# Patient Record
Sex: MALE | Race: BLACK OR AFRICAN AMERICAN
[De-identification: names, ages, dates, MRNs, and addresses within clinical notes are randomized per-mention and may not be internally consistent; named-entity substitution may affect disease eponyms.]

---

## 2022-07-05 ENCOUNTER — APPOINTMENT (OUTPATIENT)
Dept: ORTHOPEDIC SURGERY | Facility: CLINIC | Age: 47
End: 2022-07-05

## 2022-07-05 VITALS — BODY MASS INDEX: 36.45 KG/M2 | HEIGHT: 78 IN | WEIGHT: 315 LBS

## 2022-07-05 PROBLEM — Z00.00 ENCOUNTER FOR PREVENTIVE HEALTH EXAMINATION: Status: ACTIVE | Noted: 2022-07-05

## 2022-07-05 PROCEDURE — 72100 X-RAY EXAM L-S SPINE 2/3 VWS: CPT

## 2022-07-05 PROCEDURE — 78306 BONE IMAGING WHOLE BODY: CPT

## 2022-07-05 RX ORDER — METHOCARBAMOL 750 MG/1
750 TABLET, FILM COATED ORAL TWICE DAILY
Qty: 60 | Refills: 0 | Status: ACTIVE | COMMUNITY
Start: 2022-07-05 | End: 1900-01-01

## 2022-07-05 NOTE — DATA REVIEWED
[Outside X-rays] : outside x-rays [Bilateral] : of the bilateral [Hip] : hip [I reviewed the films/CD and agree] : I reviewed the films/CD and agree [FreeTextEntry1] : left shows osteolysis and loosening around the proximal left prosthesis, right is ok

## 2022-07-05 NOTE — PHYSICAL EXAM
[Flexion] : flexion [Extension] : extension [Bending to left] : bending to left [Bending to right] : bending to right [Straightening consistent with spasm] : Straightening consistent with spasm [Facet arthropathy] : Facet arthropathy [Disc space narrowing] : Disc space narrowing [de-identified] : he notes radicular complaints throughout b/l lower extremities ant to the shins [Bilateral] : hip bilaterally [] : no groin pain with resisted straight leg raise [Right] : right hip [AP] : anteroposterior [Lateral] : lateral [Components well fixed, in good position] : Components well fixed, in good position [FreeTextEntry9] : left hip with some lysis around metaphyseal aspect femoral component

## 2022-07-05 NOTE — HISTORY OF PRESENT ILLNESS
[Sudden] : sudden [8] : 8 [5] : 5 [Dull/Aching] : dull/aching [Localized] : localized [Radiating] : radiating [Tingling] : tingling [Constant] : constant [Nothing helps with pain getting better] : Nothing helps with pain getting better [Walking] : walking [de-identified] : 7-5-22- He is known to have had b/l total hip replacement left in 2010 and right in 2012.\par He notes over the last month  he has had pain lower back with radicular complaints throughout the lower legs. [facility] the hips which he presents with today. he was given naprosyn without help. 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 [FreeTextEntry6] : weakness [FreeTextEntry7] : Legs  [de-identified] : 6/26/22 [de-identified] : [facility]  [de-identified] : X-ray

## 2022-07-15 ENCOUNTER — RESULT REVIEW (OUTPATIENT)
Age: 47
End: 2022-07-15

## 2022-07-21 ENCOUNTER — APPOINTMENT (OUTPATIENT)
Dept: ORTHOPEDIC SURGERY | Facility: CLINIC | Age: 47
End: 2022-07-21

## 2022-07-21 ENCOUNTER — NON-APPOINTMENT (OUTPATIENT)
Age: 47
End: 2022-07-21

## 2022-07-21 ENCOUNTER — RESULT REVIEW (OUTPATIENT)
Age: 47
End: 2022-07-21

## 2022-07-21 ENCOUNTER — INPATIENT (INPATIENT)
Facility: HOSPITAL | Age: 47
LOS: 1 days | Discharge: HOME | DRG: 299 | End: 2022-07-23
Attending: FAMILY MEDICINE | Admitting: INTERNAL MEDICINE
Payer: COMMERCIAL

## 2022-07-21 DIAGNOSIS — I26.99 OTHER PULMONARY EMBOLISM WITHOUT ACUTE COR PULMONALE: ICD-10-CM

## 2022-07-21 LAB
ADD ON TEST-SPECIMEN IN LAB: SIGNIFICANT CHANGE UP
ALBUMIN SERPL ELPH-MCNC: 3.9 G/DL — SIGNIFICANT CHANGE UP (ref 3.3–5)
ALP SERPL-CCNC: 92 U/L — SIGNIFICANT CHANGE UP (ref 40–120)
ALT FLD-CCNC: 34 U/L — SIGNIFICANT CHANGE UP (ref 12–78)
ANION GAP SERPL CALC-SCNC: 7 MMOL/L — SIGNIFICANT CHANGE UP (ref 5–17)
APTT BLD: 42.4 SEC — HIGH (ref 27.5–35.5)
AST SERPL-CCNC: 19 U/L — SIGNIFICANT CHANGE UP (ref 15–37)
BASOPHILS # BLD AUTO: 0.03 K/UL — SIGNIFICANT CHANGE UP (ref 0–0.2)
BASOPHILS NFR BLD AUTO: 0.7 % — SIGNIFICANT CHANGE UP (ref 0–2)
BILIRUB SERPL-MCNC: 0.6 MG/DL — SIGNIFICANT CHANGE UP (ref 0.2–1.2)
BUN SERPL-MCNC: 16 MG/DL — SIGNIFICANT CHANGE UP (ref 7–23)
CALCIUM SERPL-MCNC: 9.9 MG/DL — SIGNIFICANT CHANGE UP (ref 8.5–10.1)
CHLORIDE SERPL-SCNC: 104 MMOL/L — SIGNIFICANT CHANGE UP (ref 96–108)
CO2 SERPL-SCNC: 26 MMOL/L — SIGNIFICANT CHANGE UP (ref 22–31)
CREAT SERPL-MCNC: 1.14 MG/DL — SIGNIFICANT CHANGE UP (ref 0.5–1.3)
EGFR: 81 ML/MIN/1.73M2 — SIGNIFICANT CHANGE UP
EOSINOPHIL # BLD AUTO: 0.22 K/UL — SIGNIFICANT CHANGE UP (ref 0–0.5)
EOSINOPHIL NFR BLD AUTO: 4.8 % — SIGNIFICANT CHANGE UP (ref 0–6)
GLUCOSE SERPL-MCNC: 91 MG/DL — SIGNIFICANT CHANGE UP (ref 70–99)
HCT VFR BLD CALC: 40.3 % — SIGNIFICANT CHANGE UP (ref 39–50)
HGB BLD-MCNC: 14.1 G/DL — SIGNIFICANT CHANGE UP (ref 13–17)
IMM GRANULOCYTES NFR BLD AUTO: 0.2 % — SIGNIFICANT CHANGE UP (ref 0–1.5)
INR BLD: 1.15 RATIO — SIGNIFICANT CHANGE UP (ref 0.88–1.16)
LYMPHOCYTES # BLD AUTO: 1.94 K/UL — SIGNIFICANT CHANGE UP (ref 1–3.3)
LYMPHOCYTES # BLD AUTO: 42.6 % — SIGNIFICANT CHANGE UP (ref 13–44)
MCHC RBC-ENTMCNC: 32.3 PG — SIGNIFICANT CHANGE UP (ref 27–34)
MCHC RBC-ENTMCNC: 35 GM/DL — SIGNIFICANT CHANGE UP (ref 32–36)
MCV RBC AUTO: 92.4 FL — SIGNIFICANT CHANGE UP (ref 80–100)
MONOCYTES # BLD AUTO: 0.45 K/UL — SIGNIFICANT CHANGE UP (ref 0–0.9)
MONOCYTES NFR BLD AUTO: 9.9 % — SIGNIFICANT CHANGE UP (ref 2–14)
NEUTROPHILS # BLD AUTO: 1.9 K/UL — SIGNIFICANT CHANGE UP (ref 1.8–7.4)
NEUTROPHILS NFR BLD AUTO: 41.8 % — LOW (ref 43–77)
PLATELET # BLD AUTO: 283 K/UL — SIGNIFICANT CHANGE UP (ref 150–400)
POTASSIUM SERPL-MCNC: 4 MMOL/L — SIGNIFICANT CHANGE UP (ref 3.5–5.3)
POTASSIUM SERPL-SCNC: 4 MMOL/L — SIGNIFICANT CHANGE UP (ref 3.5–5.3)
PROT SERPL-MCNC: 8.8 GM/DL — HIGH (ref 6–8.3)
PROTHROM AB SERPL-ACNC: 13.4 SEC — SIGNIFICANT CHANGE UP (ref 10.5–13.4)
RBC # BLD: 4.36 M/UL — SIGNIFICANT CHANGE UP (ref 4.2–5.8)
RBC # FLD: 12.3 % — SIGNIFICANT CHANGE UP (ref 10.3–14.5)
SODIUM SERPL-SCNC: 137 MMOL/L — SIGNIFICANT CHANGE UP (ref 135–145)
WBC # BLD: 4.55 K/UL — SIGNIFICANT CHANGE UP (ref 3.8–10.5)
WBC # FLD AUTO: 4.55 K/UL — SIGNIFICANT CHANGE UP (ref 3.8–10.5)

## 2022-07-21 PROCEDURE — 0241U: CPT

## 2022-07-21 PROCEDURE — 99214 OFFICE O/P EST MOD 30 MIN: CPT

## 2022-07-21 PROCEDURE — 85610 PROTHROMBIN TIME: CPT

## 2022-07-21 PROCEDURE — 93010 ELECTROCARDIOGRAM REPORT: CPT

## 2022-07-21 PROCEDURE — 85730 THROMBOPLASTIN TIME PARTIAL: CPT

## 2022-07-21 PROCEDURE — 85027 COMPLETE CBC AUTOMATED: CPT

## 2022-07-21 PROCEDURE — 80061 LIPID PANEL: CPT

## 2022-07-21 PROCEDURE — 99285 EMERGENCY DEPT VISIT HI MDM: CPT

## 2022-07-21 PROCEDURE — 84100 ASSAY OF PHOSPHORUS: CPT

## 2022-07-21 PROCEDURE — 80053 COMPREHEN METABOLIC PANEL: CPT

## 2022-07-21 PROCEDURE — 93306 TTE W/DOPPLER COMPLETE: CPT

## 2022-07-21 PROCEDURE — 83735 ASSAY OF MAGNESIUM: CPT

## 2022-07-21 PROCEDURE — 93971 EXTREMITY STUDY: CPT | Mod: LT

## 2022-07-21 PROCEDURE — 71275 CT ANGIOGRAPHY CHEST: CPT | Mod: 26,MA

## 2022-07-21 PROCEDURE — 84484 ASSAY OF TROPONIN QUANT: CPT

## 2022-07-21 PROCEDURE — 93005 ELECTROCARDIOGRAM TRACING: CPT

## 2022-07-21 PROCEDURE — 36415 COLL VENOUS BLD VENIPUNCTURE: CPT

## 2022-07-21 PROCEDURE — 80048 BASIC METABOLIC PNL TOTAL CA: CPT

## 2022-07-21 RX ORDER — HEPARIN SODIUM 5000 [USP'U]/ML
5000 INJECTION INTRAVENOUS; SUBCUTANEOUS EVERY 6 HOURS
Refills: 0 | Status: DISCONTINUED | OUTPATIENT
Start: 2022-07-21 | End: 2022-07-22

## 2022-07-21 RX ORDER — HEPARIN SODIUM 5000 [USP'U]/ML
10000 INJECTION INTRAVENOUS; SUBCUTANEOUS ONCE
Refills: 0 | Status: DISCONTINUED | OUTPATIENT
Start: 2022-07-21 | End: 2022-07-22

## 2022-07-21 RX ORDER — ONDANSETRON 8 MG/1
4 TABLET, FILM COATED ORAL EVERY 6 HOURS
Refills: 0 | Status: DISCONTINUED | OUTPATIENT
Start: 2022-07-21 | End: 2022-07-23

## 2022-07-21 RX ORDER — HEPARIN SODIUM 5000 [USP'U]/ML
INJECTION INTRAVENOUS; SUBCUTANEOUS
Qty: 25000 | Refills: 0 | Status: DISCONTINUED | OUTPATIENT
Start: 2022-07-21 | End: 2022-07-22

## 2022-07-21 RX ORDER — GEMFIBROZIL 600 MG
1 TABLET ORAL
Qty: 0 | Refills: 0 | DISCHARGE

## 2022-07-21 RX ORDER — ALLOPURINOL 300 MG
1 TABLET ORAL
Qty: 0 | Refills: 0 | DISCHARGE

## 2022-07-21 RX ORDER — ACETAMINOPHEN 500 MG
650 TABLET ORAL EVERY 6 HOURS
Refills: 0 | Status: DISCONTINUED | OUTPATIENT
Start: 2022-07-21 | End: 2022-07-23

## 2022-07-21 RX ORDER — HEPARIN SODIUM 5000 [USP'U]/ML
10000 INJECTION INTRAVENOUS; SUBCUTANEOUS EVERY 6 HOURS
Refills: 0 | Status: DISCONTINUED | OUTPATIENT
Start: 2022-07-21 | End: 2022-07-22

## 2022-07-21 RX ORDER — METHOCARBAMOL 500 MG/1
1 TABLET, FILM COATED ORAL
Qty: 0 | Refills: 0 | DISCHARGE

## 2022-07-21 NOTE — ED ADULT NURSE NOTE - NSIMPLEMENTINTERV_GEN_ALL_ED
Implemented All Universal Safety Interventions:  [place] to call system. Call bell, personal items and telephone within reach. Instruct patient to call for assistance. Room bathroom lighting operational. Non-slip footwear when patient is off stretcher. Physically safe environment: no spills, clutter or unnecessary equipment. Stretcher in lowest position, wheels locked, appropriate side rails in place.

## 2022-07-21 NOTE — HISTORY OF PRESENT ILLNESS
[Sudden] : sudden [8] : 8 [5] : 5 [Dull/Aching] : dull/aching [Localized] : localized [Radiating] : radiating [Tingling] : tingling [Constant] : constant [Nothing helps with pain getting better] : Nothing helps with pain getting better [Walking] : walking [de-identified] : 7-5-22- He is known to have had b/l total hip replacement left in 2010 and right in 2012.\par He notes over the last month  he has had pain lower back with radicular complaints throughout the lower legs. [facility] of the hips which he presents with today. he was given naprosyn without help. 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 [FreeTextEntry6] : weakness [FreeTextEntry7] : Legs  [de-identified] : 6/26/22 [de-identified] : [facility]  [de-identified] : X-ray

## 2022-07-21 NOTE — PHARMACOTHERAPY INTERVENTION NOTE - COMMENTS
Medication reconciliation completed.  Reviewed Medication list and confirmed med allergies with patient; confirmed with Dr. First Medron.

## 2022-07-21 NOTE — ED STATDOCS - PROGRESS NOTE DETAILS
JG:  Discussed all results with patient.  Patient unable to follow up with PMD quickly and does not have a vascular or hematology specialist yet.  Denies chest pain and no heart strain on CT.  Discussed case with ICU PA on call for [facility] team.  Will anticoagulate and they will see in AM. In the past patient has been on lovenox and coumadin.  Will get a weight, EKG, add cardiac enzymes and admit for echo, anticoagulation and thrombotic workup.

## 2022-07-21 NOTE — ED ADULT TRIAGE NOTE - CHIEF COMPLAINT QUOTE
pt c/o left sided DVT in lower extremity. pt states he has been experiencing swelling in the left upper leg. pt was seem by PCP and was sent to [facility]. pt had doppler performed which showed  a +DVT. PMH PE. pt does not currently take blood thinners. denies shortness of breath and chest pain. no signs of distress noted. pt ambulatory in triage.

## 2022-07-21 NOTE — DISCUSSION/SUMMARY
[de-identified] : Had blood clots after hip surgery, has family h/o clotting disorder. Needs STAT venous doppler left leg to r/o DVT\par For his spine, PT and Pain Management

## 2022-07-21 NOTE — ED STATDOCS - CLINICAL SUMMARY MEDICAL DECISION MAKING FREE TEXT BOX
46 year old male with h/o of PE s/p hip surgery 10 years ago. now dx with DVT in LLE which was identified due to work up for back pain. labs, CTA chest to rule out pe, pain control, and treatment for DVT.

## 2022-07-21 NOTE — ED ADULT NURSE NOTE - OBJECTIVE STATEMENT
Pt. is A&OX3, presenting to the ER with c/o see md note chief complaint quote. Pt. reports "I went for a nuclear scan and CT-scan on Friday. Today I went to see the Orthopedic doctor for the results, when he told me I needed to go for a doppler on my left leg. I went for the doppler and received the results this evening and was advised to come to the ER because I have a sharath." Denies CP, SOB, fevers, tingling in the extremity, pain, N/V/D, lightheaded or dizzy. No medications taken prior to arrival. Patient has chronic lower back pain and knee pain that he was supposed to start getting Cortisone injections on Thursday.

## 2022-07-21 NOTE — ED STATDOCS - OBJECTIVE STATEMENT
46 year old male with PMHx of PE in 2010, s/p hip replacement, lumbar bulging disc presents to the ED after being dx with DVT in left leg today. Pt states that the swelling began about a month ago. Pt exercises regularly. Pt reports that he has not been able to work because of his back pain. He was ordered to get a battery of imaging. Pt states that recent imaging showed arthritis in his knee, but that could not explain the swelling of left calf. So he was sent for an US of both legs, and a DVT was found in the left leg. Denies chest pain, mid back pain, SOB. Pt notes lower back pain.  No other injuries or complaints.  PMD Dr. Diggs

## 2022-07-21 NOTE — PHYSICAL EXAM
[Flexion] : flexion [Extension] : extension [Bending to left] : bending to left [Bending to right] : bending to right [Straightening consistent with spasm] : Straightening consistent with spasm [Facet arthropathy] : Facet arthropathy [Disc space narrowing] : Disc space narrowing [Bilateral] : hip bilaterally [Right] : right hip [AP] : anteroposterior [Lateral] : lateral [Components well fixed, in good position] : Components well fixed, in good position [Left] : left foot and ankle [Severe] : severe swelling of calf [de-identified] : he notes radicular complaints throughout b/l lower extremities ant to the shins [] : no groin pain with resisted straight leg raise [FreeTextEntry9] : left hip with some lysis around metaphyseal aspect femoral component [FreeTextEntry8] : equivocal sravani

## 2022-07-21 NOTE — REASON FOR VISIT
[FreeTextEntry2] : 7/2122- CT lumbar shows disc bulges, in particular L4/5 bulge/partially calcified with right herniation resulting in moderate central stenosis, mild left and severe right foraminal stenosis. Bone scan did not show definitive loosening of hip replacements

## 2022-07-21 NOTE — DATA REVIEWED
[Outside X-rays] : outside x-rays [CT Scan] : CT scan [Lumbar Spine] : lumbar spine [Triple Phase Bone Scan] : triple phase bone scan [Bilateral] : of the bilateral [Hip] : hip [I reviewed the films/CD and agree] : I reviewed the films/CD and agree

## 2022-07-22 DIAGNOSIS — R09.89 OTHER SPECIFIED SYMPTOMS AND SIGNS INVOLVING THE CIRCULATORY AND RESPIRATORY SYSTEMS: ICD-10-CM

## 2022-07-22 DIAGNOSIS — I26.99 OTHER PULMONARY EMBOLISM WITHOUT ACUTE COR PULMONALE: ICD-10-CM

## 2022-07-22 DIAGNOSIS — Z96.649 PRESENCE OF UNSPECIFIED ARTIFICIAL HIP JOINT: Chronic | ICD-10-CM

## 2022-07-22 LAB
ALBUMIN SERPL ELPH-MCNC: 3.7 G/DL — SIGNIFICANT CHANGE UP (ref 3.3–5)
ALP SERPL-CCNC: 84 U/L — SIGNIFICANT CHANGE UP (ref 40–120)
ALT FLD-CCNC: 35 U/L — SIGNIFICANT CHANGE UP (ref 12–78)
ANION GAP SERPL CALC-SCNC: 4 MMOL/L — LOW (ref 5–17)
APTT BLD: 49.2 SEC — HIGH (ref 27.5–35.5)
AST SERPL-CCNC: 38 U/L — HIGH (ref 15–37)
BILIRUB SERPL-MCNC: 0.5 MG/DL — SIGNIFICANT CHANGE UP (ref 0.2–1.2)
BUN SERPL-MCNC: 17 MG/DL — SIGNIFICANT CHANGE UP (ref 7–23)
CALCIUM SERPL-MCNC: 9.7 MG/DL — SIGNIFICANT CHANGE UP (ref 8.5–10.1)
CHLORIDE SERPL-SCNC: 102 MMOL/L — SIGNIFICANT CHANGE UP (ref 96–108)
CHOLEST SERPL-MCNC: 185 MG/DL — SIGNIFICANT CHANGE UP
CO2 SERPL-SCNC: 28 MMOL/L — SIGNIFICANT CHANGE UP (ref 22–31)
CREAT SERPL-MCNC: 1.04 MG/DL — SIGNIFICANT CHANGE UP (ref 0.5–1.3)
EGFR: 91 ML/MIN/1.73M2 — SIGNIFICANT CHANGE UP
FLUAV AG NPH QL: SIGNIFICANT CHANGE UP
FLUBV AG NPH QL: SIGNIFICANT CHANGE UP
GLUCOSE SERPL-MCNC: 101 MG/DL — HIGH (ref 70–99)
HCT VFR BLD CALC: 39.9 % — SIGNIFICANT CHANGE UP (ref 39–50)
HDLC SERPL-MCNC: 33 MG/DL — LOW
HGB BLD-MCNC: 13.8 G/DL — SIGNIFICANT CHANGE UP (ref 13–17)
INR BLD: 1.24 RATIO — HIGH (ref 0.88–1.16)
LIPID PNL WITH DIRECT LDL SERPL: SIGNIFICANT CHANGE UP MG/DL
MAGNESIUM SERPL-MCNC: 2.5 MG/DL — SIGNIFICANT CHANGE UP (ref 1.6–2.6)
MCHC RBC-ENTMCNC: 32.3 PG — SIGNIFICANT CHANGE UP (ref 27–34)
MCHC RBC-ENTMCNC: 34.6 GM/DL — SIGNIFICANT CHANGE UP (ref 32–36)
MCV RBC AUTO: 93.4 FL — SIGNIFICANT CHANGE UP (ref 80–100)
NON HDL CHOLESTEROL: 152 MG/DL — HIGH
PHOSPHATE SERPL-MCNC: 3.5 MG/DL — SIGNIFICANT CHANGE UP (ref 2.5–4.5)
PLATELET # BLD AUTO: 265 K/UL — SIGNIFICANT CHANGE UP (ref 150–400)
POTASSIUM SERPL-MCNC: 4.6 MMOL/L — SIGNIFICANT CHANGE UP (ref 3.5–5.3)
POTASSIUM SERPL-SCNC: 4.6 MMOL/L — SIGNIFICANT CHANGE UP (ref 3.5–5.3)
PROT SERPL-MCNC: 8.4 GM/DL — HIGH (ref 6–8.3)
PROTHROM AB SERPL-ACNC: 14.4 SEC — HIGH (ref 10.5–13.4)
RBC # BLD: 4.27 M/UL — SIGNIFICANT CHANGE UP (ref 4.2–5.8)
RBC # FLD: 12.5 % — SIGNIFICANT CHANGE UP (ref 10.3–14.5)
RSV RNA NPH QL NAA+NON-PROBE: SIGNIFICANT CHANGE UP
SARS-COV-2 RNA SPEC QL NAA+PROBE: SIGNIFICANT CHANGE UP
SODIUM SERPL-SCNC: 134 MMOL/L — LOW (ref 135–145)
TRIGL SERPL-MCNC: 437 MG/DL — HIGH
TROPONIN I, HIGH SENSITIVITY RESULT: 4.17 NG/L — SIGNIFICANT CHANGE UP
WBC # BLD: 3.58 K/UL — LOW (ref 3.8–10.5)
WBC # FLD AUTO: 3.58 K/UL — LOW (ref 3.8–10.5)

## 2022-07-22 PROCEDURE — 93971 EXTREMITY STUDY: CPT | Mod: 26,LT

## 2022-07-22 PROCEDURE — 93306 TTE W/DOPPLER COMPLETE: CPT | Mod: 26

## 2022-07-22 PROCEDURE — 99255 IP/OBS CONSLTJ NEW/EST HI 80: CPT

## 2022-07-22 PROCEDURE — 99223 1ST HOSP IP/OBS HIGH 75: CPT

## 2022-07-22 PROCEDURE — 99222 1ST HOSP IP/OBS MODERATE 55: CPT | Mod: GC

## 2022-07-22 RX ORDER — RIVAROXABAN 15 MG-20MG
1 KIT ORAL
Qty: 42 | Refills: 0
Start: 2022-07-22 | End: 2022-08-11

## 2022-07-22 RX ORDER — GEMFIBROZIL 600 MG
600 TABLET ORAL
Refills: 0 | Status: DISCONTINUED | OUTPATIENT
Start: 2022-07-22 | End: 2022-07-23

## 2022-07-22 RX ORDER — ENOXAPARIN SODIUM 100 MG/ML
160 INJECTION SUBCUTANEOUS ONCE
Refills: 0 | Status: COMPLETED | OUTPATIENT
Start: 2022-07-22 | End: 2022-07-22

## 2022-07-22 RX ORDER — ENOXAPARIN SODIUM 100 MG/ML
160 INJECTION SUBCUTANEOUS EVERY 12 HOURS
Refills: 0 | Status: DISCONTINUED | OUTPATIENT
Start: 2022-07-22 | End: 2022-07-22

## 2022-07-22 RX ORDER — APIXABAN 2.5 MG/1
10 TABLET, FILM COATED ORAL EVERY 12 HOURS
Refills: 0 | Status: DISCONTINUED | OUTPATIENT
Start: 2022-07-23 | End: 2022-07-23

## 2022-07-22 RX ORDER — APIXABAN 2.5 MG/1
1 TABLET, FILM COATED ORAL
Qty: 14 | Refills: 0
Start: 2022-07-22 | End: 2022-07-28

## 2022-07-22 RX ORDER — ALLOPURINOL 300 MG
300 TABLET ORAL DAILY
Refills: 0 | Status: DISCONTINUED | OUTPATIENT
Start: 2022-07-22 | End: 2022-07-23

## 2022-07-22 RX ADMIN — ENOXAPARIN SODIUM 160 MILLIGRAM(S): 100 INJECTION SUBCUTANEOUS at 12:31

## 2022-07-22 RX ADMIN — Medication 650 MILLIGRAM(S): at 13:40

## 2022-07-22 RX ADMIN — ENOXAPARIN SODIUM 160 MILLIGRAM(S): 100 INJECTION SUBCUTANEOUS at 23:00

## 2022-07-22 RX ADMIN — Medication 300 MILLIGRAM(S): at 10:13

## 2022-07-22 RX ADMIN — Medication 600 MILLIGRAM(S): at 10:13

## 2022-07-22 RX ADMIN — Medication 650 MILLIGRAM(S): at 23:00

## 2022-07-22 RX ADMIN — Medication 650 MILLIGRAM(S): at 12:36

## 2022-07-22 RX ADMIN — Medication 1 TABLET(S): at 10:13

## 2022-07-22 RX ADMIN — ENOXAPARIN SODIUM 160 MILLIGRAM(S): 100 INJECTION SUBCUTANEOUS at 01:15

## 2022-07-22 NOTE — H&P ADULT - ATTENDING COMMENTS
Patient is seen and examined with the resident. Agree with above assessment and plan. Admitted with Pulmonary emboli and DVT. Started on Lovenox. Follow up cardiology consult, echo.

## 2022-07-22 NOTE — PROVIDER CONTACT NOTE (EICU) - ASSESSMENT
Telehealth services precludes physical exam. Pt not examined by me via telehealth services as no access to camera. Exam as per bedside team.

## 2022-07-22 NOTE — CONSULT NOTE ADULT - ASSESSMENT
47 yo male with lower extremity DVT and acute pulmonary embolism - not submassive or massive, PESI 54- low risk, no RV strain, negative biomarkers.      Plan:  -agree with full dose AC and can transition to DOAC upon discharge  -cardiology consulted re: echo  -LE duplex to evaluate complexity of DVT  -FH of protein S deficiency. Prior hx of PE noted on short course of AC following 1st hip replacement.  Prefer to defer thrombophilia work up, as coagulation labs can be inaccurate in the setting of acute PE on AC   -pt will need outpatient hematology follow up for further evaluation of thrombophilia.   47 yo male with lower extremity DVT and acute pulmonary embolism - not submassive or massive, PESI 54- low risk, no RV strain, negative biomarkers.      Plan:  -agree with full dose AC and can transition to DOAC upon discharge  -cardiology consulted re: echo  -LE duplex to evaluate complexity of DVT  -FH of protein S deficiency. Prior hx of PE noted on short course of AC following 1st hip replacement.   Protein S deficiency increases VTE risk. Deep vein thrombosis (DVT) and pulmonary embolism (PE) are most common, but thrombosis also occurs in other sites (cerebral and mesenteric vein).  -Prefer to defer thrombophilia work up, as coagulation labs can be inaccurate in the setting of acute PE on AC.  An erroneous diagnosis can be made with acute VTE or on treatment with certain anticoagulants, especially vitamin K antagonists   -pt will need outpatient hematology follow up for further evaluation of thrombophilia.

## 2022-07-22 NOTE — PROVIDER CONTACT NOTE (EICU) - RECOMMENDATIONS
46M w/ new DVT and PE  - no evidence of submassive PE, pt is HD stable, no lab evidence of R heart strain  - agree w/ heparin gtt  - would get TTE  - heme consult for hypercoagulable workup  - cardiology to see pt in AM  - pt does not require ICU level of care at this time; please call back with any further questions or if clinical status changes  Discussed w/ ICU PA
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
## 2022-07-22 NOTE — CONSULT NOTE ADULT - NS ATTEND AMEND GEN_ALL_CORE FT
Agree with above. 45 yo M w PMH of PE in 2012 after hip replacement surgery, gout & hypertriglyceridemia presents to  ED after LE US found a DVT in the outpt setting. New DVT imaging reviewed and it is nonconclusive with above and below knee. No high risk findings on exam. Does have enlarged left leg which could be baseline from prior surgery, however no pain, redness, paresthesias. Low villata score. No plan for invasive management of DVT or PE. Given family history and multiple VTE, patient likely will warrant life long AC. Outpatient hypercoagulable work up. Findings relayed to patient and patient's family.     Thank you for allowing me to participate in the care of your patient. Feel free to contact me if any clinical issues arise.    Garrison Amin MD, [facility], [facility]   Director, Interventional Cardiology, [facility]  Faculty Interventional Cardiologist, [facility] Office: [phone]  [place] Office: [phone]  Email: [email or web address]

## 2022-07-22 NOTE — CONSULT NOTE ADULT - SUBJECTIVE AND OBJECTIVE BOX
HPI:  Pt is a 47 yo M w PMH of PE in 2012 after hip replacement surgery, gout & hypertriglyceridemia presents to  ED after LE US found a DVT in the outpt setting. Pt states that over the last couple of weeks, he's been experiencing hip & back pain and has been seeing Ortho for further evaluation. Today, when in a visit, he c/o of his LLE being swollen which prompted his Orthopedic to order an LE US which demonstrated a DVT. Pt was then recommended to present to ED. Pt noticed his LLE swollen 2 weeks ago. Denies inciting event, recent surgeries, immobilization or trauma to area. Admits to recent travel, but states leg was swollen from before travel. FH notable for protein S deficiency in mother and various sisters. Pt does not recall how long he was on AC for his PE, but estimates 3 months, was on Lovenox, then transitioned to Coumadin. Currently denies chest pain, palpitations, SOB, dizziness or syncopal episode.     In ED, vitals WNL, HD stable. Labs WNL. CT chest Angio demonstrated pulmonary embolus in a right lower lobar segmental pulmonary artery extending into subsegmental branches. No evidence of right heart strain. Pt was given Lovenox in ED. Evaluated by PERT team in ED, BNP & Trops WNL, bedside echo did not demonstrate any RV strain. No need for ICU for intervention, recommended full dose AC.     (22 Jul 2022 03:43)    Admitted PE & DVT.  Anticoag started.  Heme consulted as appears unprovoked.  Pt reports chronic LLE edema but this has worsened in past 1-2 weeks.    PAST MEDICAL AND SURGICAL HISTORY:  PAST MEDICAL & SURGICAL HISTORY:  S/P hip replacement      Pulmonary embolism  2010 after his replacement      Gout      Hypertriglyceridemia      S/P hip replacement          ALLERGIES:  Allergies    Cheese (Unknown)  No Known Drug Allergies    Intolerances        SOCIAL HISTORY:  Social History:  Admits to 2-3 shots a week, smokes an occasional cigar. Denies recreational substance use. 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)      FAMILY  HISTORY:  FAMILY HISTORY:  Family history of protein S deficiency (Mother, Sibling)        MEDICATIONS:  OUTPATIENT:  Home Medications:  allopurinol 300 mg oral tablet: 1 tab(s) orally once a day (21 Jul 2022 23:40)  gemfibrozil 600 mg oral tablet: 1 tab(s) orally once a day (21 Jul 2022 23:40)  Medrol Dosepak 4 mg oral tablet: Medrol Dosepak Instructions:  Take 6 tablets by mouth on day 1, then 5 tablets on day 2, then 4 tablets on day 3, then 3 tablets on day 4, then 2 tablets on day 5, then 1 tablet on day 6  ***course complete*** (21 Jul 2022 23:40)  methocarbamol 750 mg oral tablet: 1 tab(s) orally once a day (at bedtime), As Needed (21 Jul 2022 23:40)  Multiple Vitamins oral tablet: 1 tab(s) orally once a day (21 Jul 2022 23:40)  naproxen 500 mg oral tablet: 1 tab(s) orally 2 times a day, As Needed (21 Jul 2022 23:40)      INPATIENT:  MEDICATIONS  (STANDING):  allopurinol 300 milliGRAM(s) Oral daily  enoxaparin Injectable 160 milliGRAM(s) SubCutaneous every 12 hours  gemfibrozil 600 milliGRAM(s) Oral two times a day  multivitamin 1 Tablet(s) Oral daily    MEDICATIONS  (PRN):  acetaminophen     Tablet .. 650 milliGRAM(s) Oral every 6 hours PRN Mild Pain (1 - 3)  ondansetron Injectable 4 milliGRAM(s) IV Push every 6 hours PRN Nausea and/or Vomiting    MEDICATIONS  (PRN):  acetaminophen     Tablet .. 650 milliGRAM(s) Oral every 6 hours PRN Mild Pain (1 - 3)  ondansetron Injectable 4 milliGRAM(s) IV Push every 6 hours PRN Nausea and/or Vomiting      REVIEW OF SYSTEMS:  ===============================  ===============================  CONSTITUTIONAL: No weakness, fevers or chills  EYES/ENT: No visual changes;  No vertigo or throat pain   NECK: No pain or stiffness  RESPIRATORY: No cough, wheezing, hemoptysis; No shortness of breath  CARDIOVASCULAR: No chest pain or palpitations  GASTROINTESTINAL: No abdominal or epigastric pain. No nausea, vomiting, or hematemesis;   No diarrhea or constipation. No melena or hematochezia.  GENITOURINARY: No dysuria, frequency or hematuria  NEUROLOGICAL: No numbness or weakness  SKIN: No itching, burning, rashes, or lesions   All other review of systems is negative unless indicated above    Vital Signs Last 24 Hrs  T(C): 36.8 (22 Jul 2022 15:32), Max: 37.1 (22 Jul 2022 09:10)  T(F): 98.2 (22 Jul 2022 15:32), Max: 98.8 (22 Jul 2022 09:10)  HR: 79 (22 Jul 2022 15:32) (65 - 79)  BP: 116/73 (22 Jul 2022 15:32) (116/73 - 154/91)  BP(mean): 65 (22 Jul 2022 08:34) (65 - 65)  RR: 18 (22 Jul 2022 15:32) (18 - 19)  SpO2: 99% (22 Jul 2022 15:32) (98% - 100%)    Parameters below as of 22 Jul 2022 15:32  Patient On (Oxygen Delivery Method): room air        I&O's Summary      I&O's Detail      PHYSICAL EXAM:    Constitutional: NAD, awake and alert, well-developed  HEENT: PERR, EOMI,  No oral cyananosis.  Neck:  supple,  No JVD  Respiratory: Breath sounds are clear bilaterally, No wheezing, rales or rhonchi  Cardiovascular: S1 and S2, regular rate and rhythm, no Murmurs, gallops or rubs  Gastrointestinal: Bowel Sounds present, soft, nontender.   Extremities: No peripheral edema. No clubbing or cyanosis.  Vascular: 2+ peripheral pulses  Neurological: A/O x 3, no focal deficits  Musculoskeletal: no calf tenderness.  Skin: No rashes.    ===============================  ===============================  LABS:                         13.8   3.58  )-----------( 265      ( 22 Jul 2022 08:45 )             39.9                         14.1   4.55  )-----------( 283      ( 21 Jul 2022 20:44 )             40.3     22 Jul 2022 08:45    134    |  102    |  17     ----------------------------<  101    4.6     |  28     |  1.04   21 Jul 2022 20:44    137    |  104    |  16     ----------------------------<  91     4.0     |  26     |  1.14     Ca    9.7        22 Jul 2022 08:45  Ca    9.9        21 Jul 2022 20:44  Phos  3.5       22 Jul 2022 08:45  Mg     2.5       22 Jul 2022 08:45    TPro  8.4    /  Alb  3.7    /  TBili  0.5    /  DBili  x      /  AST  38     /  ALT  35     /  AlkPhos  84     22 Jul 2022 08:45  TPro  8.8    /  Alb  3.9    /  TBili  0.6    /  DBili  x      /  AST  19     /  ALT  34     /  AlkPhos  92     21 Jul 2022 20:44    PT/INR - ( 22 Jul 2022 08:45 )   PT: 14.4 sec;   INR: 1.24 ratio         PTT - ( 22 Jul 2022 08:45 )  PTT:49.2 sec    THYROID STUDIES:    ===============================  ===============================  CARDIAC BIOMARKERS:  -------  -BNP VALUES:  07-21 @ 20:44  Pro Bnp 7    -------  -TROPONIN VALUES:   Troponin I, High Sensitivity Result: 4.17 ng/L (07-22-22 @ 05:03)  Troponin I, High Sensitivity Result: 3.54 ng/L (07-21-22 @ 20:44)      ===============================  ===============================  EKG:  NSR LAFB no ischemic changes.    ==========================    Harrison Valerio M.D.  Cardiology, [facility]  Cell:   Office:   [phone] ([facility] Office)  [phone] ([facility] Office)    ===============================
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
## 2022-07-22 NOTE — H&P ADULT - ASSESSMENT
47 yo M w PMH of PE in 2012 after hip replacement surgery, gout & hypertriglyceridemia presents to  ED after LE US found a DVT in the outpt setting. Pt admitted and being managed for RLL subsegmental PE.     #Pulmonary Embolus  -Admit to remote tele  -Pt presented to ED after outpt LE US confirmed LLE DVT  -CT Angio demonstrates pulmonary embolus in a right lower lobar segmental pulmonary artery extending into subsegmental branches  -S/P Lovenox in ED  -Evaluated by PERT team in ED: not submassive or massive, PESI 54- low risk, no RV strain, negative biomarkers  -Start on full dose Lovenox BID  -Likely transition to DOAC as per Cardiology PERT, F/U consult  -F/U Echo, no RV strain on CT  -F/U LE US to evaluate complexity of DVT  -F/U repeat trop, 1st negative  -F/U Heme consult, pt may need hypercoagulable work-up    #Gout  -Continue home dose Allopurinol    #Hypertriglyceridemia  -Continue home dose Gemfibrozil  -F/U AM labs    #Diet  -Regular diet    #DVT PPX  -On full dose Lovenox    #Advanced Directives  -FULL CODE    *Above discussed w Dr. Henry

## 2022-07-22 NOTE — H&P ADULT - NSHPPHYSICALEXAM_GEN_ALL_CORE
Vitals  T(F): 98.6 (07-21-22 @ 23:51), Max: 98.6 (07-21-22 @ 23:51)  HR: 65 (07-21-22 @ 23:51) (62 - 65)  BP: 148/89 (07-21-22 @ 23:51) (148/89 - 149/96)  RR: 18 (07-21-22 @ 23:51) (18 - 18)  SpO2: 98% (07-21-22 @ 23:51) (98% - 98%)    Physical Exam   Gen: NAD, comfortable  HENT: atraumatic head and ears, no gross abnormalities of ears, mucous membranes moist, no oral lesions, neck supple without masses/goiter/lymphadenopathy  CV: RRR, nl s1/s2, no M/R/G  RESP: nl respiratory effort, CTAB, no wheezes/crackles/rhonchi  Back: no scoliosis, lordosis, or kyphosis, no tenderness  GI: normoactive bowel sounds in all 4 quadrants, soft, nontender, nondistended, no rebound, no guarding, no masses  Extremities: LLE slightly more edematous then left, nontender to palpation, negative Flo's sign, pedal pulses palpable   Skin: nl warm and dry, no wounds   Neuro: A&Ox3, answering questions appropriately, PERRL, EOMI, face symmetric, sensation equal bilaterally in face, tongue midline, no dysarthria, 5/5 strength in upper and lower extremities bilaterally, sensation intact in upper and lower extremities bilaterally, nl finger to nose, and nl heel to shin   Pysch: no depression, no SI, no HI

## 2022-07-22 NOTE — H&P ADULT - NSICDXPASTMEDICALHX_GEN_ALL_CORE_FT
PAST MEDICAL HISTORY:  Gout     Hypertriglyceridemia     Pulmonary embolism 2010 after his replacement    S/P hip replacement

## 2022-07-22 NOTE — PROVIDER CONTACT NOTE (EICU) - BACKGROUND
46M w/ b/l hip replacements, HLD, gout, prior PE in setting of 1st hip replacement, family hx of protein S deficiency. Sent in from outpatient ortho office w/ finding of LLE DVT, concern for possible PE. Pt was having LBP and radiculopathy and incidentally found to have LLE swelling and + for DVT. CTA in ED shows "Pulmonary embolus in a right lower lobar segmental pulmonary artery extending into subsegmental branches. No evidence of right heart strain." VS stable - no tachycardia or hypoxia. Troponins and BNP negative. PERT team consulted.

## 2022-07-22 NOTE — H&P ADULT - DOES THIS PATIENT HAVE A HISTORY OF OR HAS BEEN DX WITH HEART FAILURE?
[Breast Self Exam] : breast self exam [Exercise] : exercise [Nutrition] : nutrition [Contraception] : contraception [Vitamins/Supplements] : vitamins/supplements no [Safe Sexual Practices] : safe sexual practices [Medication Management] : medication management

## 2022-07-22 NOTE — H&P ADULT - NSICDXFAMILYHX_GEN_ALL_CORE_FT
FAMILY HISTORY:  Mother  Still living? Unknown  Family history of protein S deficiency, Age at diagnosis: Age Unknown    Sibling  Still living? Unknown  Family history of protein S deficiency, Age at diagnosis: Age Unknown

## 2022-07-22 NOTE — PATIENT PROFILE ADULT - FALL HARM RISK - UNIVERSAL INTERVENTIONS
Bed in lowest position, wheels locked, appropriate side rails in place/Call bell, personal items and telephone in reach/Instruct patient to call for assistance before getting out of bed or chair/Non-slip footwear when patient is out of bed/[place] to call system/Physically safe environment - no spills, clutter or unnecessary equipment/Purposeful Proactive Rounding/Room/bathroom lighting operational, light cord in reach

## 2022-07-22 NOTE — PROGRESS NOTE ADULT - ASSESSMENT
Assessment:  · Assessment	  45 yo M w PMH of PE in 2012 after hip replacement surgery, gout & hypertriglyceridemia presents to  ED after LE US found a DVT in the outpt setting. Pt admitted and being managed for RLL subsegmental PE.     #Pulmonary Embolus  -Admit to remote tele  -Pt presented to ED after outpt LE US confirmed LLE DVT  -CT Angio demonstrates pulmonary embolus in a right lower lobar segmental pulmonary artery extending into subsegmental branches  -S/P Lovenox in ED  -Evaluated by PERT team in ED: not submassive or massive, PESI 54- low risk, no RV strain, negative biomarkers  -Start on full dose Lovenox BID  -Likely transition to DOAC upon d/c as per Cardiology PERT, F/U consult  -Echo as above  -LE US as seen above  - Troponin 4.17  -Heme consulted, states pt will need outpatient hematology follow up for further evaluation of thrombophilia.      #Gout  -Continue home dose Allopurinol    #Hypertriglyceridemia  -Continue home dose Gemfibrozil  -F/U AM labs    #Diet  -Regular diet    #DVT PPX  -On full dose Lovenox    #Advanced Directives  -FULL CODE    *Above discussed w Dr. Worley

## 2022-07-22 NOTE — H&P ADULT - NSHPSOCIALHISTORY_GEN_ALL_CORE
Admits to 2-3 shots a week, smokes an occasional cigar. Denies recreational substance use. Works in construction.

## 2022-07-22 NOTE — PATIENT PROFILE ADULT - MEDICATIONS/VISITS
Reason for Call:  Medication or medication refill:    Do you use a [facility]?  Name of the pharmacy and phone number for the current request:  [facility]     Name of the medication requested: lisdexamfetamine (VYVANSE) 30 MG capsule      Can we leave a detailed message on this number? YES    Phone number patient can be reached at: Cell number on file:    Telephone Information:   Mobile [phone]       Best Time:     Call taken on 7/6/2022 at 11:44 AM by Ca Bunn       No 31-Oct-2020 11:51 no

## 2022-07-22 NOTE — PROGRESS NOTE ADULT - SUBJECTIVE AND OBJECTIVE BOX
HPI:  Pt is a 45 yo M w PMH of PE in 2012 after hip replacement surgery, gout & hypertriglyceridemia presents to  ED after LE US found a DVT in the outpt setting. Pt states that over the last couple of weeks, he's been experiencing hip & back pain and has been seeing Ortho for further evaluation. Today, when in a visit, he c/o of his LLE being swollen which prompted his Orthopedic to order an LE US which demonstrated a DVT. Pt was then recommended to present to ED. Pt noticed his LLE swollen 2 weeks ago. Denies inciting event, recent surgeries, immobilization or trauma to area. Admits to recent travel, but states leg was swollen from before travel. FH notable for protein S deficiency in mother and various sisters. Pt does not recall how long he was on AC for his PE, but estimates 3 months, was on Lovenox, then transitioned to Coumadin. Currently denies chest pain, palpitations, SOB, dizziness or syncopal episode.     In ED, vitals WNL, HD stable. Labs WNL. CT chest Angio demonstrated pulmonary embolus in a right lower lobar segmental pulmonary artery extending into subsegmental branches. No evidence of right heart strain. Pt was given Lovenox in ED. Evaluated by PERT team in ED, BNP & Trops WNL, bedside echo did not demonstrate any RV strain. No need for ICU for intervention, recommended full dose AC.     (22 Jul 2022 03:43)    SUBJECTIVE:   7/22: Pt seen and examined at bedside. Pt complains of LLE pain and swelling.     REVIEW OF SYSTEMS:  Gen: No fever, chills, weakness  ENT: No visual changes or throat pain  Neck: No pain or stiffness  Respiratory: No cough or wheezing  Cardiovascular: No chest pain or palpitations  Gastrointestinal: No abdominal pain, nausea, vomiting, constipation, or diarrhea  Hematologic: No easy bleeding or bruising  Neurologic: No numbness or focal weakness  Psych: No depression or insomnia  Skin: No rash or itching    Vital Signs Last 24 Hrs  T(C): 37.1 (22 Jul 2022 09:10), Max: 37.1 (22 Jul 2022 09:10)  T(F): 98.8 (22 Jul 2022 09:10), Max: 98.8 (22 Jul 2022 09:10)  HR: 68 (22 Jul 2022 09:10) (62 - 72)  BP: 121/83 (22 Jul 2022 09:10) (121/83 - 154/91)  BP(mean): 65 (22 Jul 2022 08:34) (65 - 108)  RR: 18 (22 Jul 2022 09:10) (18 - 19)  SpO2: 100% (22 Jul 2022 09:10) (98% - 100%)    Parameters below as of 22 Jul 2022 09:10  Patient On (Oxygen Delivery Method): room air          PHYSICAL EXAM:  Gen: NAD, comfortable  HENT: atraumatic head and ears, no gross abnormalities of ears, mucous membranes moist, no oral lesions, neck supple without masses/goiter/lymphadenopathy  CV: RRR, nl s1/s2, no M/R/G  RESP: nl respiratory effort, CTAB, no wheezes/crackles/rhonchi  Back: no scoliosis, lordosis, or kyphosis, no tenderness  GI: normoactive bowel sounds in all 4 quadrants, soft, nontender, nondistended, no rebound, no guarding, no masses  Extremities: LLE slightly more edematous then left, nontender to palpation, negative Flo's sign, pedal pulses palpable   Skin: nl warm and dry, no wounds   Neuro: A&Ox3, answering questions appropriately, PERRL, EOMI, face symmetric, sensation equal bilaterally in face, tongue midline, no dysarthria, 5/5 strength in upper and lower extremities bilaterally, sensation intact in upper and lower extremities bilaterally, nl finger to nose, and nl heel to shin   Pysch: no depression, no SI, no HI    LABS: All Labs Reviewed:                        13.8   3.58  )-----------( 265      ( 22 Jul 2022 08:45 )             39.9     07-22    134<L>  |  102  |  17  ----------------------------<  101<H>  4.6   |  28  |  1.04    Ca    9.7      22 Jul 2022 08:45  Phos  3.5     07-22  Mg     2.5     07-22    TPro  8.4<H>  /  Alb  3.7  /  TBili  0.5  /  DBili  x   /  AST  38<H>  /  ALT  35  /  AlkPhos  84  07-22    PT/INR - ( 22 Jul 2022 08:45 )   PT: 14.4 sec;   INR: 1.24 ratio         PTT - ( 22 Jul 2022 08:45 )  PTT:49.2 sec      Blood Culture:    Radiology  < from: CT Angio Chest PE Protocol w/ IV Cont (07.21.22 @ 21:35) >  IMPRESSION:  Pulmonary embolus in a right lower lobar segmental pulmonary artery   extending into subsegmental branches. No evidence of right heart strain.    Critical findings were discussed with Dr. Chew of the emergency   Department at 9:50 PM on 7/21/2022.    < end of copied text >    < from: US Duplex Venous Lower Ext Ltd, Left (07.22.22 @ 04:44) >  IMPRESSION:    Above and below-the-knee deep vein thrombosis of the left lower extremity   as described.    < end of copied text >    < from: TTE Echo Complete w/o Contrast w/ Doppler (07.22.22 @ 07:57) >   Impression     Summary     The aortic valve is trileaflet with thin pliable leaflets.   Normal appearing left atrium.   The left ventricle is normal in size, wall thickness, wall motion and   contractility.   Estimated left ventricular ejection fraction is 50-55 %.   The IVC is normal in size with decreased respiratory variation.   The mitral valve leaflets appear thin and normal.   No evidence of pericardial effusion.   No evidence of pleural effusion.   Normal appearing pulmonic valve structure.   Trace pulmonic valvular regurgitation is present.   Normal appearing right atrium.   Normal appearing right ventricle structure and function.   The tricuspid valve leaflets are thin and pliable; valve motion is   normal.   Trace tricuspid valve regurgitation is present.    < end of copied text >
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
## 2022-07-22 NOTE — H&P ADULT - NSHPREVIEWOFSYSTEMS_GEN_ALL_CORE
Gen: No fever, chills, weakness  ENT: No visual changes or throat pain  Neck: No pain or stiffness  Respiratory: No cough or wheezing  Cardiovascular: No chest pain or palpitations  Gastrointestinal: No abdominal pain, nausea, vomiting, constipation, or diarrhea  Hematologic: No easy bleeding or bruising  Neurologic: No numbness or focal weakness  Psych: No depression or insomnia  Skin: No rash or itching

## 2022-07-22 NOTE — CONSULT NOTE ADULT - ASSESSMENT
Impression:  1. lower extremity DVT  2. acute pulmonary embolism - not submassive or massive, PESI 54- low risk, no RV strain, negative biomarkers    Plan:  1. start full dose AC, can use IV heparin, pt is refusing tx dose lovenox, titrate as per full AC nomogram  2. likely transition to DOAC as per cardiology PERT f/u in am  3. Echo  4. LE duplex to evaluate complexity of DVT  5. heme consult for full hypercoag workup  6. no indication for ICU at this time  7. full PERT consultation to follow in am    Case and plan discussed with eICU attending Dr Dejesus.

## 2022-07-23 ENCOUNTER — TRANSCRIPTION ENCOUNTER (OUTPATIENT)
Age: 47
End: 2022-07-23

## 2022-07-23 VITALS
SYSTOLIC BLOOD PRESSURE: 140 MMHG | TEMPERATURE: 98 F | HEART RATE: 77 BPM | RESPIRATION RATE: 17 BRPM | OXYGEN SATURATION: 98 % | DIASTOLIC BLOOD PRESSURE: 84 MMHG

## 2022-07-23 DIAGNOSIS — I82.409 ACUTE EMBOLISM AND THROMBOSIS OF UNSPECIFIED DEEP VEINS OF UNSPECIFIED LOWER EXTREMITY: ICD-10-CM

## 2022-07-23 LAB
ANION GAP SERPL CALC-SCNC: 2 MMOL/L — LOW (ref 5–17)
BUN SERPL-MCNC: 15 MG/DL — SIGNIFICANT CHANGE UP (ref 7–23)
CALCIUM SERPL-MCNC: 9.9 MG/DL — SIGNIFICANT CHANGE UP (ref 8.5–10.1)
CHLORIDE SERPL-SCNC: 104 MMOL/L — SIGNIFICANT CHANGE UP (ref 96–108)
CO2 SERPL-SCNC: 28 MMOL/L — SIGNIFICANT CHANGE UP (ref 22–31)
CREAT SERPL-MCNC: 1.07 MG/DL — SIGNIFICANT CHANGE UP (ref 0.5–1.3)
EGFR: 88 ML/MIN/1.73M2 — SIGNIFICANT CHANGE UP
GLUCOSE SERPL-MCNC: 108 MG/DL — HIGH (ref 70–99)
HCT VFR BLD CALC: 39.9 % — SIGNIFICANT CHANGE UP (ref 39–50)
HGB BLD-MCNC: 14 G/DL — SIGNIFICANT CHANGE UP (ref 13–17)
MCHC RBC-ENTMCNC: 33.1 PG — SIGNIFICANT CHANGE UP (ref 27–34)
MCHC RBC-ENTMCNC: 35.1 GM/DL — SIGNIFICANT CHANGE UP (ref 32–36)
MCV RBC AUTO: 94.3 FL — SIGNIFICANT CHANGE UP (ref 80–100)
PLATELET # BLD AUTO: 258 K/UL — SIGNIFICANT CHANGE UP (ref 150–400)
POTASSIUM SERPL-MCNC: 4.8 MMOL/L — SIGNIFICANT CHANGE UP (ref 3.5–5.3)
POTASSIUM SERPL-SCNC: 4.8 MMOL/L — SIGNIFICANT CHANGE UP (ref 3.5–5.3)
RBC # BLD: 4.23 M/UL — SIGNIFICANT CHANGE UP (ref 4.2–5.8)
RBC # FLD: 12.2 % — SIGNIFICANT CHANGE UP (ref 10.3–14.5)
SODIUM SERPL-SCNC: 134 MMOL/L — LOW (ref 135–145)
WBC # BLD: 3.44 K/UL — LOW (ref 3.8–10.5)
WBC # FLD AUTO: 3.44 K/UL — LOW (ref 3.8–10.5)

## 2022-07-23 PROCEDURE — 99238 HOSP IP/OBS DSCHRG MGMT 30/<: CPT | Mod: GC

## 2022-07-23 PROCEDURE — 99233 SBSQ HOSP IP/OBS HIGH 50: CPT

## 2022-07-23 RX ORDER — APIXABAN 2.5 MG/1
2 TABLET, FILM COATED ORAL
Qty: 60 | Refills: 0
Start: 2022-07-23 | End: 2022-08-06

## 2022-07-23 RX ADMIN — Medication 650 MILLIGRAM(S): at 12:12

## 2022-07-23 RX ADMIN — APIXABAN 10 MILLIGRAM(S): 2.5 TABLET, FILM COATED ORAL at 10:27

## 2022-07-23 RX ADMIN — Medication 300 MILLIGRAM(S): at 09:02

## 2022-07-23 RX ADMIN — Medication 1 TABLET(S): at 09:02

## 2022-07-23 RX ADMIN — Medication 600 MILLIGRAM(S): at 09:02

## 2022-07-23 NOTE — DISCHARGE NOTE PROVIDER - NSDCCPCAREPLAN_GEN_ALL_CORE_FT
PRINCIPAL DISCHARGE DIAGNOSIS  Diagnosis: Acute pulmonary embolism  Assessment and Plan of Treatment: You were found to have a clot in your lungs. Please  the new medication called Eliquis that was sent to your pharmacy. Please follow up with Heme/Onc as instructed during your hospital stay.  Follow up with your Hematologist for continued management recommendations. Please take extra care when moving around and exercising, because this medication thins your blood and makes you more susceptible to bleeding. Please return to the ED if you have a return of the symptoms that brought you into the hospital, or if you notice any new bleeding, or if you hit your head.      SECONDARY DISCHARGE DIAGNOSES  Diagnosis: DVT of lower limb, acute  Assessment and Plan of Treatment: You were found to have blood clots in your legs. Please continue taking the medication as indicated above. Please make the extra effort to be physically active (lots of walking) and to avoid being sedentary (sitting) as much as possible to further prevent additional clots from forming.

## 2022-07-23 NOTE — PROGRESS NOTE ADULT - SUBJECTIVE AND OBJECTIVE BOX
Chief Complaint: leg swelling, hip/back pain    HPI: (From H&P) Pt is a 47 yo M w PMH of PE in 2012 after hip replacement surgery, gout & hypertriglyceridemia presents to  ED after LE US found a DVT in the outpt setting. Pt states that over the last couple of weeks, he's been experiencing hip & back pain and has been seeing Ortho for further evaluation. Today, when in a visit, he c/o of his LLE being swollen which prompted his Orthopedic to order an LE US which demonstrated a DVT. Pt was then recommended to present to ED. Pt noticed his LLE swollen 2 weeks ago. Denies inciting event, recent surgeries, immobilization or trauma to area. Admits to recent travel, but states leg was swollen from before travel. FH notable for protein S deficiency in mother and various sisters. Pt does not recall how long he was on AC for his PE, but estimates 3 months, was on Lovenox, then transitioned to Coumadin. Currently denies chest pain, palpitations, SOB, dizziness or syncopal episode. In ED, vitals WNL, HD stable. Labs WNL. CT chest Angio demonstrated pulmonary embolus in a right lower lobar segmental pulmonary artery extending into subsegmental branches. No evidence of right heart strain. Pt was given Lovenox in ED. Evaluated by PERT team in ED, BNP & Trops WNL, bedside echo did not demonstrate any RV strain. No need for ICU for intervention, recommended full dose AC.   (22 Jul 2022 03:43)    Admitted PE & DVT.  Anticoag started.  Heme consulted as appears unprovoked.  Pt reports chronic LLE edema but this has worsened in past 1-2 weeks.    ROS/Subjective:  7/23/22:     PAST MEDICAL AND SURGICAL HISTORY:  S/P hip replacement  Pulmonary embolism 2010 after his replacement  Gout  Hypertriglyceridemia  S/P hip replacement    ALLERGIES:  Cheese (Unknown)  No Known Drug Allergies    SOCIAL HISTORY:  Admits to 2-3 shots a week, smokes an occasional cigar.   Denies recreational substance use.   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)    FAMILY  HISTORY:  Family history of protein S deficiency (Mother, Sibling)    Home Medications:  allopurinol 300 mg oral tablet: 1 tab(s) orally once a day (21 Jul 2022 23:40)  gemfibrozil 600 mg oral tablet: 1 tab(s) orally once a day (21 Jul 2022 23:40)  Medrol Dosepak 4 mg oral tablet: Medrol Dosepak Instructions:  Take 6 tablets by mouth on day 1, then 5 tablets on day 2, then 4 tablets on day 3, then 3 tablets on day 4, then 2 tablets on day 5, then 1 tablet on day 6 ***course complete*** (21 Jul 2022 23:40)  methocarbamol 750 mg oral tablet: 1 tab(s) orally once a day (at bedtime), As Needed (21 Jul 2022 23:40)  Multiple Vitamins oral tablet: 1 tab(s) orally once a day (21 Jul 2022 23:40)  naproxen 500 mg oral tablet: 1 tab(s) orally 2 times a day, As Needed (21 Jul 2022 23:40)    MEDICATIONS  (STANDING):  allopurinol 300 milliGRAM(s) Oral daily  apixaban 10 milliGRAM(s) Oral every 12 hours  gemfibrozil 600 milliGRAM(s) Oral two times a day  multivitamin 1 Tablet(s) Oral daily    MEDICATIONS  (PRN):  acetaminophen     Tablet .. 650 milliGRAM(s) Oral every 6 hours PRN Mild Pain (1 - 3)  ondansetron Injectable 4 milliGRAM(s) IV Push every 6 hours PRN Nausea and/or Vomiting    PHYSICAL EXAM:  T(C): 36.8 (23 Jul 2022 08:06), Max: 37 (22 Jul 2022 23:49)  T(F): 98.3 (23 Jul 2022 08:06), Max: 98.6 (22 Jul 2022 23:49)  HR: 77 (23 Jul 2022 08:06) (77 - 79)  BP: 140/84 (23 Jul 2022 08:06) (116/73 - 140/84)  RR: 17 (23 Jul 2022 08:06) (17 - 18)  SpO2: 98% (23 Jul 2022 08:06) (98% - 100%)    Constitutional: NAD, awake and alert, well-developed  HEENT: PERR, EOMI,  No oral cyananosis.  Neck:  supple,  No JVD  Respiratory: Breath sounds are clear bilaterally, No wheezing, rales or rhonchi  Cardiovascular: S1 and S2, regular rate and rhythm, no Murmurs, gallops or rubs  Gastrointestinal: Bowel Sounds present, soft, nontender.   Extremities: No peripheral edema. No clubbing or cyanosis.  Vascular: 2+ peripheral pulses  Neurological: A/O x 3, no focal deficits  Musculoskeletal: no calf tenderness.  Skin: No rashes.    ===============================  LABS: All Labs Reviewed:                        14.0   3.44  )-----------( 258      ( 23 Jul 2022 08:31 )             39.9     07-23    134<L>  |  104  |  15  ----------------------------<  108<H>  4.8   |  28  |  1.07    Ca    9.9      23 Jul 2022 08:31  Phos  3.5     07-22  Mg     2.5     07-22    TPro  8.4<H>  /  Alb  3.7  /  TBili  0.5  /  DBili  x   /  AST  38<H>  /  ALT  35  /  AlkPhos  84  07-22    PT/INR - ( 22 Jul 2022 08:45 )   PT: 14.4 sec;   INR: 1.24 ratio       PTT - ( 22 Jul 2022 08:45 )  PTT:49.2 sec    Troponin: 3.54 ng/L, Troponin: 4.17 ng/L    BNP: 7    ===============================    EKG:  NSR LAFB no ischemic changes.    ==========================    ECHO: 7/22/22: The aortic valve is trileaflet with thin pliable leaflets. Normal appearing left atrium. The left ventricle is normal in size, wall thickness, wall motion and contractility. Estimated left ventricular ejection fraction is 50-55 %. The IVC is normal in size with decreased respiratory variation. The mitral valve leaflets appear thin and normal. No evidence of pericardial effusion. No evidence of pleural effusion. Normal appearing pulmonic valve structure. Trace pulmonic valvular regurgitation is present. Normal appearing right atrium. Normal appearing right ventricle structure and function. The tricuspid valve leaflets are thin and pliable; valve motion is normal. Trace tricuspid valve regurgitation is present.    ==========================    RADIOLOGY:    7/21/22: CT Angio Chest PE Protocol w/ IV Cont: Pulmonary embolus in a right lower lobar segmental pulmonary artery extending into subsegmental branches. No evidence of right heart strain.    7/22/22: US Duplex Venous Lower Ext Ltd, Left This study was technically difficult due to soft tissue edema. There is no obvious thrombus in the left common femoral vein or proximal femoral vein. There is partial thrombosis of the mid/distal femoral vein, popliteal vein, gastrocnemius vein, tibioperoneal trunk, posterior tibial vein, peroneal vein and soleal vein.  IMPRESSION: Above and below-the-knee deep vein thrombosis of the left lower extremity as described.     Chief Complaint: leg swelling, hip/back pain    HPI: (From H&P) Pt is a 45 yo M w PMH of PE in 2012 after hip replacement surgery, gout & hypertriglyceridemia presents to  ED after LE US found a DVT in the outpt setting. Pt states that over the last couple of weeks, he's been experiencing hip & back pain and has been seeing Ortho for further evaluation. Today, when in a visit, he c/o of his LLE being swollen which prompted his Orthopedic to order an LE US which demonstrated a DVT. Pt was then recommended to present to ED. Pt noticed his LLE swollen 2 weeks ago. Denies inciting event, recent surgeries, immobilization or trauma to area. Admits to recent travel, but states leg was swollen from before travel. FH notable for protein S deficiency in mother and various sisters. Pt does not recall how long he was on AC for his PE, but estimates 3 months, was on Lovenox, then transitioned to Coumadin. Currently denies chest pain, palpitations, SOB, dizziness or syncopal episode. In ED, vitals WNL, HD stable. Labs WNL. CT chest Angio demonstrated pulmonary embolus in a right lower lobar segmental pulmonary artery extending into subsegmental branches. No evidence of right heart strain. Pt was given Lovenox in ED. Evaluated by PERT team in ED, BNP & Trops WNL, bedside echo did not demonstrate any RV strain. No need for ICU for intervention, recommended full dose AC.   (22 Jul 2022 03:43)    Admitted PE & DVT.  Anticoag started.  Heme consulted as appears unprovoked.  Pt reports chronic LLE edema but this has worsened in past 1-2 weeks.    ROS/Subjective:  7/23/22: Feeling well no complaints. comfortable. echo reviewed with patient.     PAST MEDICAL AND SURGICAL HISTORY:  S/P hip replacement  Pulmonary embolism 2010 after his replacement  Gout  Hypertriglyceridemia  S/P hip replacement    ALLERGIES:  Cheese (Unknown)  No Known Drug Allergies    SOCIAL HISTORY:  Admits to 2-3 shots a week, smokes an occasional cigar.   Denies recreational substance use.   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)    FAMILY  HISTORY:  Family history of protein S deficiency (Mother, Sibling)    Home Medications:  allopurinol 300 mg oral tablet: 1 tab(s) orally once a day (21 Jul 2022 23:40)  gemfibrozil 600 mg oral tablet: 1 tab(s) orally once a day (21 Jul 2022 23:40)  Medrol Dosepak 4 mg oral tablet: Medrol Dosepak Instructions:  Take 6 tablets by mouth on day 1, then 5 tablets on day 2, then 4 tablets on day 3, then 3 tablets on day 4, then 2 tablets on day 5, then 1 tablet on day 6 ***course complete*** (21 Jul 2022 23:40)  methocarbamol 750 mg oral tablet: 1 tab(s) orally once a day (at bedtime), As Needed (21 Jul 2022 23:40)  Multiple Vitamins oral tablet: 1 tab(s) orally once a day (21 Jul 2022 23:40)  naproxen 500 mg oral tablet: 1 tab(s) orally 2 times a day, As Needed (21 Jul 2022 23:40)    MEDICATIONS  (STANDING):  allopurinol 300 milliGRAM(s) Oral daily  apixaban 10 milliGRAM(s) Oral every 12 hours  gemfibrozil 600 milliGRAM(s) Oral two times a day  multivitamin 1 Tablet(s) Oral daily    MEDICATIONS  (PRN):  acetaminophen     Tablet .. 650 milliGRAM(s) Oral every 6 hours PRN Mild Pain (1 - 3)  ondansetron Injectable 4 milliGRAM(s) IV Push every 6 hours PRN Nausea and/or Vomiting    PHYSICAL EXAM:  T(C): 36.8 (23 Jul 2022 08:06), Max: 37 (22 Jul 2022 23:49)  T(F): 98.3 (23 Jul 2022 08:06), Max: 98.6 (22 Jul 2022 23:49)  HR: 77 (23 Jul 2022 08:06) (77 - 79)  BP: 140/84 (23 Jul 2022 08:06) (116/73 - 140/84)  RR: 17 (23 Jul 2022 08:06) (17 - 18)  SpO2: 98% (23 Jul 2022 08:06) (98% - 100%)    Constitutional: NAD, awake and alert, well-developed  HEENT: PERR, EOMI,  No oral cyananosis.  Neck:  supple,  No JVD  Respiratory: Breath sounds are clear bilaterally, No wheezing, rales or rhonchi  Cardiovascular: S1 and S2, regular rate and rhythm, no Murmurs, gallops or rubs  Gastrointestinal: Bowel Sounds present, soft, nontender.   Extremities: No peripheral edema. No clubbing or cyanosis.  Vascular: 2+ peripheral pulses  Neurological: A/O x 3, no focal deficits  Musculoskeletal: no calf tenderness.  Skin: No rashes.    ===============================  LABS: All Labs Reviewed:                        14.0   3.44  )-----------( 258      ( 23 Jul 2022 08:31 )             39.9     07-23    134<L>  |  104  |  15  ----------------------------<  108<H>  4.8   |  28  |  1.07    Ca    9.9      23 Jul 2022 08:31  Phos  3.5     07-22  Mg     2.5     07-22    TPro  8.4<H>  /  Alb  3.7  /  TBili  0.5  /  DBili  x   /  AST  38<H>  /  ALT  35  /  AlkPhos  84  07-22    PT/INR - ( 22 Jul 2022 08:45 )   PT: 14.4 sec;   INR: 1.24 ratio       PTT - ( 22 Jul 2022 08:45 )  PTT:49.2 sec    Troponin: 3.54 ng/L, Troponin: 4.17 ng/L    BNP: 7    ===============================    EKG:  NSR LAFB no ischemic changes.    ==========================    ECHO: 7/22/22: The aortic valve is trileaflet with thin pliable leaflets. Normal appearing left atrium. The left ventricle is normal in size, wall thickness, wall motion and contractility. Estimated left ventricular ejection fraction is 50-55 %. The IVC is normal in size with decreased respiratory variation. The mitral valve leaflets appear thin and normal. No evidence of pericardial effusion. No evidence of pleural effusion. Normal appearing pulmonic valve structure. Trace pulmonic valvular regurgitation is present. Normal appearing right atrium. Normal appearing right ventricle structure and function. The tricuspid valve leaflets are thin and pliable; valve motion is normal. Trace tricuspid valve regurgitation is present.    ==========================    RADIOLOGY:    7/21/22: CT Angio Chest PE Protocol w/ IV Cont: Pulmonary embolus in a right lower lobar segmental pulmonary artery extending into subsegmental branches. No evidence of right heart strain.    7/22/22: US Duplex Venous Lower Ext Ltd, Left This study was technically difficult due to soft tissue edema. There is no obvious thrombus in the left common femoral vein or proximal femoral vein. There is partial thrombosis of the mid/distal femoral vein, popliteal vein, gastrocnemius vein, tibioperoneal trunk, posterior tibial vein, peroneal vein and soleal vein.  IMPRESSION: Above and below-the-knee deep vein thrombosis of the left lower extremity as described.

## 2022-07-23 NOTE — DISCHARGE NOTE NURSING/CASE MANAGEMENT/SOCIAL WORK - NSDCPEFALRISK_GEN_ALL_CORE
For information on Fall & Injury Prevention, visit: [email or web address] OR  [email or web address] OR  https://www.cdc.gov/steadi/patient.html

## 2022-07-23 NOTE — DISCHARGE NOTE NURSING/CASE MANAGEMENT/SOCIAL WORK - PATIENT PORTAL LINK FT
You can access the FollowMyHealth Patient Portal offered by [facility] by registering at the following website: [email or web address]. By joining [facility]’s FollowMyHealth portal, you will also be able to view your health information using other applications (apps) compatible with our system.

## 2022-07-23 NOTE — PROGRESS NOTE ADULT - PROBLEM SELECTOR PLAN 1
Agree w/ anticoagulation - Continue Eliquis  Not candidate for catheter based thrombolysis  Echocardiogram reviewed and confirmed no evidence of RV dysfunction/strain.  Recommend outpatient hematology follow up for further evaluation of thrombophilia.
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
## 2022-07-23 NOTE — DISCHARGE NOTE PROVIDER - NSDCCAREPROVSEEN_GEN_ALL_CORE_FT
Adria, Zachary Henry, Sharyn Huff, Ly Best, Vikash Mary, Eros Valerio, Harrison Billings, Davis Amin, Garrison Teague, Indu Saeed, Jj Burch, Jeannine Mohan, Wandy Dalton

## 2022-07-23 NOTE — DISCHARGE NOTE PROVIDER - NSDCMRMEDTOKEN_GEN_ALL_CORE_FT
allopurinol 300 mg oral tablet: 1 tab(s) orally once a day  apixaban 5 mg oral tablet: 7/23/22 take 2 pills at bedtime. 7/24/22 - 7/30/22 take 2 pills morning and night. Starting 7/30/22 take 1 pill morning and night.  gemfibrozil 600 mg oral tablet: 1 tab(s) orally once a day  methocarbamol 750 mg oral tablet: 1 tab(s) orally once a day (at bedtime), As Needed  Multiple Vitamins oral tablet: 1 tab(s) orally once a day  naproxen 500 mg oral tablet: 1 tab(s) orally 2 times a day, As Needed

## 2022-07-23 NOTE — DISCHARGE NOTE PROVIDER - CARE PROVIDER_API CALL
Hermelinda Hernandez)  Medical Oncology  [address], [place] [address]  Phone: [phone]  Fax: [phone]  Established Patient  Follow Up Time:

## 2022-07-23 NOTE — DISCHARGE NOTE PROVIDER - NSDCFUSCHEDAPPT_GEN_ALL_CORE_FT
Ravinder Reyes  [facility]  ONCPAINMGT [address]   Scheduled Appointment: 07/28/2022    Pio Amin  [facility]  ONCORTHO [address]  Scheduled Appointment: 08/18/2022

## 2022-07-25 PROBLEM — E78.1 PURE HYPERGLYCERIDEMIA: Chronic | Status: ACTIVE | Noted: 2022-07-22

## 2022-07-25 PROBLEM — I26.99 OTHER PULMONARY EMBOLISM WITHOUT ACUTE COR PULMONALE: Chronic | Status: ACTIVE | Noted: 2022-07-22

## 2022-07-25 PROBLEM — Z96.649 PRESENCE OF UNSPECIFIED ARTIFICIAL HIP JOINT: Chronic | Status: ACTIVE | Noted: 2022-07-21

## 2022-07-25 PROBLEM — M10.9 GOUT, UNSPECIFIED: Chronic | Status: ACTIVE | Noted: 2022-07-22

## 2022-07-27 DIAGNOSIS — Z86.711 PERSONAL HISTORY OF PULMONARY EMBOLISM: ICD-10-CM

## 2022-07-27 DIAGNOSIS — I82.402 ACUTE EMBOLISM AND THROMBOSIS OF UNSPECIFIED DEEP VEINS OF LEFT LOWER EXTREMITY: ICD-10-CM

## 2022-07-27 DIAGNOSIS — M10.9 GOUT, UNSPECIFIED: ICD-10-CM

## 2022-07-27 DIAGNOSIS — Z79.01 LONG TERM (CURRENT) USE OF ANTICOAGULANTS: ICD-10-CM

## 2022-07-27 DIAGNOSIS — E78.1 PURE HYPERGLYCERIDEMIA: ICD-10-CM

## 2022-07-27 DIAGNOSIS — E78.5 HYPERLIPIDEMIA, UNSPECIFIED: ICD-10-CM

## 2022-07-27 DIAGNOSIS — Z91.011 ALLERGY TO MILK PRODUCTS: ICD-10-CM

## 2022-07-27 DIAGNOSIS — I26.99 OTHER PULMONARY EMBOLISM WITHOUT ACUTE COR PULMONALE: ICD-10-CM

## 2022-07-28 ENCOUNTER — APPOINTMENT (OUTPATIENT)
Dept: PAIN MANAGEMENT | Facility: CLINIC | Age: 47
End: 2022-07-28

## 2022-07-28 VITALS — BODY MASS INDEX: 36.45 KG/M2 | WEIGHT: 315 LBS | HEIGHT: 78 IN

## 2022-07-28 DIAGNOSIS — Z86.2 PERSONAL HISTORY OF DISEASES OF THE BLOOD AND BLOOD-FORMING ORGANS AND CERTAIN DISORDERS INVOLVING THE IMMUNE MECHANISM: ICD-10-CM

## 2022-07-28 PROCEDURE — 99244 OFF/OP CNSLTJ NEW/EST MOD 40: CPT

## 2022-07-28 NOTE — DISCUSSION/SUMMARY
[Surgical risks reviewed] : Surgical risks reviewed [de-identified] : After discussing various treatment options with the patient including but not limited to oral medications, physical therapy, exercise,\par modalities as well as interventional spinal injections, we have decided with the following plan\par \par I personally reviewed the MRI/CT scan images and agree with the radiologist's report. The\par radiological findings were discussed with the patient.\par \par \par The risks, benefits, contents and alternatives to injection were explained in full to the patient. Risks outlined include but are not\par limited to infection,sepsis, bleeding, post-dural puncture headache, nerve damage, temporary increase in pain, syncopal episode,\par failure to resolve symptoms, allergic reaction, symptom recurrence, and elevation of blood sugar in diabetics. Cortisone may cause\par immunosuppression. Patient understands the risks. All questions were answered. After discussion of options, patient requested\par an injection. Information regarding the injection was given to the patient. Which medications to stop prior to the injection was\par explained to the patient as well.\par \par Follow up in 1-2 weeks post injection for re-evaluation.\par \par  Conservative Care\par Continue Home exercises, stretching, activity modification, physical therapy, and conservative care.\par \par Will require Heme clearance.

## 2022-07-28 NOTE — REASON FOR VISIT
[Initial Consultation] : an initial pain management consultation [FreeTextEntry2] : Pt was referred by  for his back pain

## 2022-07-28 NOTE — CONSULT LETTER
[Dear  ___] : Dear  [unfilled], [Consult Letter:] : I had the pleasure of evaluating your patient, [unfilled]. [Please see my note below.] : Please see my note below. [Consult Closing:] : Thank you very much for allowing me to participate in the care of this patient.  If you have any questions, please do not hesitate to contact me. [Sincerely,] : Sincerely, [FreeTextEntry3] : Ravinder Reyes MD\par

## 2022-07-28 NOTE — PHYSICAL EXAM
[Normal Coordination] : normal coordination [Normal DTR UE/LE] : normal DTR UE/LE  [Normal Sensation] : normal sensation [Normal Mood and Affect] : normal mood and affect [Orientated] : orientated [Able to Communicate] : able to communicate [Normal Skin] : normal skin [No Rash] : no rash [No Ulcers] : no ulcers [No Lesions] : no lesions [Well Developed] : well developed [No obvious lymphadenopathy in areas examined] : no obvious lymphadenopathy in areas examined [No Respiratory Distress] : no respiratory distress [] : mildly antalgic

## 2022-07-28 NOTE — HISTORY OF PRESENT ILLNESS
[Lower back] : lower back [10] : 10 [8] : 8 [Burning] : burning [Radiating] : radiating [Sharp] : sharp [Stabbing] : stabbing [Squeezing] : squeezing [Constant] : constant [Household chores] : household chores [Leisure] : leisure [Sleep] : sleep [Meds] : meds [Nothing helps with pain getting better] : Nothing helps with pain getting better [Sitting] : sitting [Standing] : standing [Walking] : walking [Stairs] : stairs [FreeTextEntry1] : Back pain with radiation to bilateral lower ext.. Pain began approx. 1 month ago, sudden onset, no known trigger event, but he works as a construction for roads. Denies bowel/bladder sympotms. Prior ELANA 10 years ago bilaterally. Course complicated at that time by a DVT and this course complicated again by a DVT seen on STAT doppler 7-21-22 now on Eliquis. MRI reviewed. Pain is worsened with walking. Pain improves with sitting and rest.  [] : no [FreeTextEntry7] : b/l legs [de-identified] :

## 2022-07-28 NOTE — DATA REVIEWED
[CT Scan] : CT scan [Lumbar Spine] : lumbar spine [Report was reviewed and noted in the chart] : The report was reviewed and noted in the chart

## 2022-08-01 ENCOUNTER — OUTPATIENT (OUTPATIENT)
Dept: OUTPATIENT SERVICES | Facility: HOSPITAL | Age: 47
LOS: 1 days | Discharge: HOME | End: 2022-08-01

## 2022-08-01 DIAGNOSIS — Z96.649 PRESENCE OF UNSPECIFIED ARTIFICIAL HIP JOINT: Chronic | ICD-10-CM

## 2022-08-01 DIAGNOSIS — D64.9 ANEMIA, UNSPECIFIED: ICD-10-CM

## 2022-08-02 ENCOUNTER — APPOINTMENT (OUTPATIENT)
Dept: HEMATOLOGY ONCOLOGY | Facility: CLINIC | Age: 47
End: 2022-08-02

## 2022-08-02 ENCOUNTER — RESULT REVIEW (OUTPATIENT)
Age: 47
End: 2022-08-02

## 2022-08-02 VITALS
RESPIRATION RATE: 16 BRPM | OXYGEN SATURATION: 98 % | HEIGHT: 78 IN | SYSTOLIC BLOOD PRESSURE: 150 MMHG | WEIGHT: 315 LBS | TEMPERATURE: 96.8 F | BODY MASS INDEX: 36.45 KG/M2 | DIASTOLIC BLOOD PRESSURE: 87 MMHG | HEART RATE: 97 BPM

## 2022-08-02 DIAGNOSIS — Z82.49 FAMILY HISTORY OF ISCHEMIC HEART DISEASE AND OTHER DISEASES OF THE CIRCULATORY SYSTEM: ICD-10-CM

## 2022-08-02 DIAGNOSIS — Z78.9 OTHER SPECIFIED HEALTH STATUS: ICD-10-CM

## 2022-08-02 DIAGNOSIS — Z87.891 PERSONAL HISTORY OF NICOTINE DEPENDENCE: ICD-10-CM

## 2022-08-02 DIAGNOSIS — M10.9 GOUT, UNSPECIFIED: ICD-10-CM

## 2022-08-02 DIAGNOSIS — E78.1 PURE HYPERGLYCERIDEMIA: ICD-10-CM

## 2022-08-02 LAB
BASOPHILS # BLD AUTO: 0.04 K/UL — SIGNIFICANT CHANGE UP (ref 0–0.2)
BASOPHILS NFR BLD AUTO: 1.1 % — SIGNIFICANT CHANGE UP (ref 0–2)
EOSINOPHIL # BLD AUTO: 0.18 K/UL — SIGNIFICANT CHANGE UP (ref 0–0.5)
EOSINOPHIL NFR BLD AUTO: 4.8 % — SIGNIFICANT CHANGE UP (ref 0–6)
HCT VFR BLD CALC: 39.4 % — SIGNIFICANT CHANGE UP (ref 39–50)
HGB BLD-MCNC: 14.2 G/DL — SIGNIFICANT CHANGE UP (ref 13–17)
IMM GRANULOCYTES NFR BLD AUTO: 0.5 % — SIGNIFICANT CHANGE UP (ref 0–1.5)
LYMPHOCYTES # BLD AUTO: 1.61 K/UL — SIGNIFICANT CHANGE UP (ref 1–3.3)
LYMPHOCYTES # BLD AUTO: 42.8 % — SIGNIFICANT CHANGE UP (ref 13–44)
MCHC RBC-ENTMCNC: 33 PG — SIGNIFICANT CHANGE UP (ref 27–34)
MCHC RBC-ENTMCNC: 36 G/DL — SIGNIFICANT CHANGE UP (ref 32–36)
MCV RBC AUTO: 91.6 FL — SIGNIFICANT CHANGE UP (ref 80–100)
MONOCYTES # BLD AUTO: 0.39 K/UL — SIGNIFICANT CHANGE UP (ref 0–0.9)
MONOCYTES NFR BLD AUTO: 10.4 % — SIGNIFICANT CHANGE UP (ref 2–14)
NEUTROPHILS # BLD AUTO: 1.52 K/UL — LOW (ref 1.8–7.4)
NEUTROPHILS NFR BLD AUTO: 40.4 % — LOW (ref 43–77)
NRBC # BLD: 0 /100 WBCS — SIGNIFICANT CHANGE UP (ref 0–0)
PLATELET # BLD AUTO: 308 K/UL — SIGNIFICANT CHANGE UP (ref 150–400)
RBC # BLD: 4.3 M/UL — SIGNIFICANT CHANGE UP (ref 4.2–5.8)
RBC # FLD: 12.7 % — SIGNIFICANT CHANGE UP (ref 10.3–14.5)
WBC # BLD: 3.76 K/UL — LOW (ref 3.8–10.5)
WBC # FLD AUTO: 3.76 K/UL — LOW (ref 3.8–10.5)

## 2022-08-02 PROCEDURE — 99215 OFFICE O/P EST HI 40 MIN: CPT

## 2022-08-02 RX ORDER — GEMFIBROZIL 600 MG/1
600 TABLET, FILM COATED ORAL
Refills: 0 | Status: ACTIVE | COMMUNITY
Start: 2022-08-02

## 2022-08-02 RX ORDER — ALLOPURINOL 100 MG/1
100 TABLET ORAL
Refills: 0 | Status: ACTIVE | COMMUNITY
Start: 2022-08-02

## 2022-08-05 LAB
DNA PLOIDY SPEC FC-IMP: NORMAL
PTR INTERP: NORMAL

## 2022-08-05 NOTE — PHYSICAL EXAM
[Obese] : obese [Normal] : affect appropriate [de-identified] : 1+ pitting edema LLE up to the knee. Trace edema of RLE. Chronic venous stasis changes of both lower LE. [de-identified] : No palpable tenderness [de-identified] : Limited ambulation due to pain from lumbar radiculopathy, but 5/5 strength both LE

## 2022-08-05 NOTE — ASSESSMENT
[FreeTextEntry1] : 46M hx b/l total hip replacements, hypertriglyceridemia, gout, lumbar radiculopathy, referred to hematology for DVT/PE and management of anticoagulation prior to spinal steroid injections.\par \par 1. Acute DVT of Left Lower Extremity\par Recently had left calf swelling July 2022 with LE Dopplers of LLE showing above the knee DVT. Also found to have small PE on CTA chest at [place], started on Lovenox, transitioned to Eliquis on discharge 7/23/22. Ortho/pain mgmt planning on spinal steroid injections but need recommendations about anticoagulation.\par - Given active above the knee thrombosis and high risk of embolization, would defer spinal injections for now\par - Will get repeat LE Dopplers around 8/16. If thrombus resolved, can hold Eliquis for 48hrs prior to the spinal injections. If not, then will need another risk/benefit discussion for holding Eliquis or bridging with Lovenox.\par - Eliquis 5mg BID renewed today\par - Sending Factor V Leiden and prothrombin gene mutation testing today\par \par RTO 3 months.\par \par Case discussed with Dr. Goldberg.\par \par Urban Ramos M.D.\par Hematology/Oncology Fellow PGY-4

## 2022-08-05 NOTE — END OF VISIT
[] : Fellow [FreeTextEntry3] : Case reviewed with Dr. Ramos. Agree with plan as stated above.\par Patient with unprovoked DVT based on clinical scenario. At this point cannot recommend holding anticoagulation for any period of time given high risk of embolization in the acute setting. Discussed extensively with patient and will repeat US in 3 weeks and if no active thrombosis can re-visit. Otherwise, if injection is urgent can consider IVC filter temporarily.

## 2022-08-05 NOTE — HISTORY OF PRESENT ILLNESS
[de-identified] : 46M hx b/l total hip replacements, hypertriglyceridemia, gout, lumbar radiculopathy, referred to hematology for DVT/PE. Initially had PE postoperatively after bilateral hip replacement in 2010. Was started on warfarin, anticoagulated for 3 months, stopped it for 2 weeks, hypercoagulable workup was negative. No events between 2010 and now. He was having pain in the hips, thought it was due to an issue with the prosthetics, had some Xrays done of the hips. Also had some pain in his back so he had Xrays of the back as well in July. Plan with ortho was to have PT and cortisone injections. However, lower left leg was swollen at the time, so ortho ordered LE Dopplers. DVT found; sent to [place] [facility]. Admitted, found to have a small PE as well. Started on Lovenox, discharged 7/23/22 on Eliquis. He was supposed to get cortisone shots for his lumbar radiculopathy, but cannot due to the Eliquis.

## 2022-08-12 ENCOUNTER — APPOINTMENT (OUTPATIENT)
Dept: CARDIOLOGY | Facility: CLINIC | Age: 47
End: 2022-08-12

## 2022-08-12 ENCOUNTER — NON-APPOINTMENT (OUTPATIENT)
Age: 47
End: 2022-08-12

## 2022-08-12 VITALS
WEIGHT: 315 LBS | HEIGHT: 78 IN | OXYGEN SATURATION: 96 % | BODY MASS INDEX: 36.45 KG/M2 | DIASTOLIC BLOOD PRESSURE: 101 MMHG | SYSTOLIC BLOOD PRESSURE: 132 MMHG | HEART RATE: 98 BPM | RESPIRATION RATE: 17 BRPM

## 2022-08-12 PROCEDURE — 93000 ELECTROCARDIOGRAM COMPLETE: CPT

## 2022-08-12 PROCEDURE — 99214 OFFICE O/P EST MOD 30 MIN: CPT

## 2022-08-12 NOTE — HISTORY OF PRESENT ILLNESS
[FreeTextEntry1] : Reshma Lynne is a 46M hx b/l total hip replacements, hypertriglyceridemia, gout, lumbar radiculopathy, who presents today for follow up post DVT/PE. Initially had PE postoperatively after bilateral hip replacement in 2010. Was started on warfarin, anticoagulated for 3 months, stopped it for 2 weeks, hypercoagulable workup was negative. No events between 2010 and now. He was having pain in the hips, thought it was due to an issue with the prosthetics, had some Xrays done of the hips. Also had some pain in his back so he had Xrays of the back as well in July. Plan with ortho was to have PT and cortisone injections. However, lower left leg was swollen at the time, so ortho ordered LE Dopplers. DVT found; sent to [place] [facility]. Admitted, found to have a small PE as well. Started on Lovenox, discharged 7/23/22 on Eliquis. He was supposed to get cortisone shots for his lumbar radiculopathy, but cannot due to the Eliquis. \par \par Denies chest pain, sob however states when he ambulates, he starts to feel numbness in the leg and heaviiness. \par Apparently, when back got worse, he felt pain there with shooting pain to leg which he still has however the numbness, fullness is new in the left lower leg. No ulcers.

## 2022-08-12 NOTE — DISCUSSION/SUMMARY
[FreeTextEntry1] : 46M hx b/l total hip replacements, hypertriglyceridemia, gout, lumbar radiculopathy, referred to vascular cardiology for DVT/PE and management of thromboembolic disease. \par \par Acute DVT of Left Lower Extremity\par Recently had left calf swelling July 2022 with LE Dopplers of LLE showing above the knee DVT. Also found to have small PE on CTA chest at [place], started on Lovenox, transitioned to Eliquis on discharge 7/23/22. Ortho/pain mgmt planning on spinal steroid injections but need recommendations about anticoagulation.\par - Given active above the knee thrombosis and high risk of embolization, would defer spinal injections for now till more time on anticoagulation from most recent thromboembolic disease. \par - agree with hematology for repeat LE Dopplers around 8/16. Agree for further risk stratification as per that study however given within 3 months of recent PE, would recommend Lovenox bridge given his substantial limitation given that he does also have PE and to limit the amount of time off the AC\par -if there is more thrombus/DVT on subsequent study, may need to consider IVC filter if anticoagulation needs to be held\par - c/w Eliquis 5mg BID \par -ace bandage of left leg\par - Hypercoagulable study as per hematology with Factor V Leiden and prothrombin gene mutation testing today\par \par RTO 1 month

## 2022-08-12 NOTE — CARDIOLOGY SUMMARY
[de-identified] : [id number] 05:44PM  DOPPLER VENOUS LEFT LOWER EXTREMITY  DOPPLER VENOUS LEFT LOWER EXTREMITY: EXAM: DOPPLER VENOUS LEFT LOWER EXTREMITY  HISTORY: M79.662 Left lower leg pain   COMPARISON: None.  FINDINGS: Duplex and color-flow Doppler imaging of the lower extremity venous system was performed. There is thrombus noted in the distal femoral vein, popliteal vein, proximal posterior tibial vein and left gastrocnemius vein. The remainder of the left lower extremity deep venous system is patent.  There is no Baker's cyst.  IMPRESSION: Positive deep venous thrombosis.  Signed by: Cassy Edwards MD  Signed Date: 7/21/2022 5:51 PM EDT [de-identified] : [id number] 05:44PM \par DOPPLER VENOUS LEFT LOWER EXTREMITY \par DOPPLER VENOUS LEFT LOWER EXTREMITY: EXAM: DOPPLER VENOUS LEFT LOWER EXTREMITY\par \par HISTORY: M79.662 Left lower leg pain\par \par \par COMPARISON: None.\par \par FINDINGS:\par Duplex and color-flow Doppler imaging of the lower extremity venous system was\par performed. There is thrombus noted in the distal femoral vein, popliteal vein,\par proximal posterior tibial vein and left gastrocnemius vein. The remainder of the\par left lower extremity deep venous system is patent.\par \par There is no Baker's cyst.\par \par IMPRESSION:\par Positive deep venous thrombosis.\par \par Signed by: Cassy Edwards MD\par  Signed Date: 7/21/2022 5:51 PM EDT

## 2022-08-18 ENCOUNTER — APPOINTMENT (OUTPATIENT)
Dept: ORTHOPEDIC SURGERY | Facility: CLINIC | Age: 47
End: 2022-08-18

## 2022-08-18 VITALS — BODY MASS INDEX: 36.45 KG/M2 | WEIGHT: 315 LBS | HEIGHT: 78 IN

## 2022-08-18 DIAGNOSIS — M47.26 OTHER SPONDYLOSIS WITH RADICULOPATHY, LUMBAR REGION: ICD-10-CM

## 2022-08-18 DIAGNOSIS — Z96.643 PRESENCE OF ARTIFICIAL HIP JOINT, BILATERAL: ICD-10-CM

## 2022-08-18 DIAGNOSIS — Z86.718 PERSONAL HISTORY OF OTHER VENOUS THROMBOSIS AND EMBOLISM: ICD-10-CM

## 2022-08-18 PROCEDURE — 99213 OFFICE O/P EST LOW 20 MIN: CPT

## 2022-08-18 NOTE — REASON FOR VISIT
[FreeTextEntry2] : 8/18/22: Here for f/up b/l knees/back. STAT Doppler from 7/21/22 was positive for DVT in the LLE. He is now on Elliquis; currently being managed by heme and cardio.  Started PT for back/left leg, awaiting LESI

## 2022-08-18 NOTE — PHYSICAL EXAM
[Flexion] : flexion [Extension] : extension [Bending to left] : bending to left [Bending to right] : bending to right [Straightening consistent with spasm] : Straightening consistent with spasm [Facet arthropathy] : Facet arthropathy [Disc space narrowing] : Disc space narrowing [Bilateral] : hip bilaterally [Right] : right hip [AP] : anteroposterior [Lateral] : lateral [Components well fixed, in good position] : Components well fixed, in good position [Left] : left foot and ankle [Severe] : severe swelling of calf [de-identified] : he notes radicular complaints throughout b/l lower extremities ant to the shins [] : no groin pain with resisted straight leg raise [FreeTextEntry9] : left hip with some lysis around metaphyseal aspect femoral component [FreeTextEntry8] : equivocal sravani

## 2022-08-18 NOTE — HISTORY OF PRESENT ILLNESS
[de-identified] : 7-5-22- He is known to have had b/l total hip replacement left in 2010 and right in 2012.\par He notes over the last month  he has had pain lower back with radicular complaints throughout the lower legs. [facility] which he presents with today. he was given naprosyn without help. \par \par pmh gout\par \par works in construction \par \par  [FreeTextEntry1] : b/l knees/back

## 2022-09-21 ENCOUNTER — NON-APPOINTMENT (OUTPATIENT)
Age: 47
End: 2022-09-21

## 2022-09-23 ENCOUNTER — APPOINTMENT (OUTPATIENT)
Dept: HEMATOLOGY ONCOLOGY | Facility: CLINIC | Age: 47
End: 2022-09-23

## 2022-09-23 PROCEDURE — 99441: CPT

## 2022-09-23 RX ORDER — ENOXAPARIN SODIUM 150 MG/ML
150 INJECTION, SOLUTION SUBCUTANEOUS TWICE DAILY
Qty: 2 | Refills: 0 | Status: ACTIVE | COMMUNITY
Start: 2022-09-23 | End: 1900-01-01

## 2022-10-14 ENCOUNTER — NON-APPOINTMENT (OUTPATIENT)
Age: 47
End: 2022-10-14

## 2022-10-14 ENCOUNTER — APPOINTMENT (OUTPATIENT)
Dept: CARDIOLOGY | Facility: CLINIC | Age: 47
End: 2022-10-14

## 2022-10-14 ENCOUNTER — TRANSCRIPTION ENCOUNTER (OUTPATIENT)
Age: 47
End: 2022-10-14

## 2022-10-14 ENCOUNTER — APPOINTMENT (OUTPATIENT)
Dept: ORTHOPEDIC SURGERY | Facility: CLINIC | Age: 47
End: 2022-10-14

## 2022-10-14 VITALS
OXYGEN SATURATION: 95 % | BODY MASS INDEX: 18.28 KG/M2 | DIASTOLIC BLOOD PRESSURE: 88 MMHG | HEART RATE: 94 BPM | SYSTOLIC BLOOD PRESSURE: 122 MMHG | WEIGHT: 158 LBS | HEIGHT: 78 IN

## 2022-10-14 PROCEDURE — 99215 OFFICE O/P EST HI 40 MIN: CPT

## 2022-10-14 PROCEDURE — 99213 OFFICE O/P EST LOW 20 MIN: CPT

## 2022-10-14 PROCEDURE — 93000 ELECTROCARDIOGRAM COMPLETE: CPT

## 2022-10-14 NOTE — REASON FOR VISIT
[FreeTextEntry2] : 10/14/22- Making slow but steady progress with PT\par 8/18/22: Here for f/up b/l knees/back. STAT Doppler from 7/21/22 was positive for DVT in the LLE. He is now on Elliquis; currently being managed by heme and cardio.  Started PT for back/left leg, awaiting SADIE

## 2022-10-14 NOTE — PHYSICAL EXAM
[Straightening consistent with spasm] : Straightening consistent with spasm [Facet arthropathy] : Facet arthropathy [Disc space narrowing] : Disc space narrowing [Bilateral] : hip bilaterally [Right] : right hip [AP] : anteroposterior [Lateral] : lateral [Components well fixed, in good position] : Components well fixed, in good position [Left] : left foot and ankle [Severe] : severe swelling of calf [de-identified] : he notes radicular complaints throughout b/l lower extremities ant to the shins [] : no groin pain with resisted straight leg raise [FreeTextEntry9] : left hip with some lysis around metaphyseal aspect femoral component [FreeTextEntry8] : equivocal sravani

## 2022-10-14 NOTE — CARDIOLOGY SUMMARY
[de-identified] : Sept 21 2022: Left sided popliteal and down thrombus (no femoral involvement) \par \par [id number] 05:44PM \par DOPPLER VENOUS LEFT LOWER EXTREMITY \par DOPPLER VENOUS LEFT LOWER EXTREMITY: EXAM: DOPPLER VENOUS LEFT LOWER EXTREMITY\par \par HISTORY: M79.662 Left lower leg pain\par \par \par COMPARISON: None.\par \par FINDINGS:\par Duplex and color-flow Doppler imaging of the lower extremity venous system was\par performed. There is thrombus noted in the distal femoral vein, popliteal vein,\par proximal posterior tibial vein and left gastrocnemius vein. The remainder of the\par left lower extremity deep venous system is patent.\par \par There is no Baker's cyst.\par \par IMPRESSION:\par Positive deep venous thrombosis.\par \par Signed by: Cassy Edwards MD\par  Signed Date: 7/21/2022 5:51 PM EDT

## 2022-10-14 NOTE — HISTORY OF PRESENT ILLNESS
[FreeTextEntry1] : Reshma Lynne is a 46M hx b/l total hip replacements, hypertriglyceridemia, gout, lumbar radiculopathy, who presents today for follow up post DVT/PE. Initially had PE postoperatively after bilateral hip replacement in 2010. Was started on warfarin, anticoagulated for 3 months, stopped it for 2 weeks, hypercoagulable workup was negative. No events between 2010 and now. He was having pain in the hips, thought it was due to an issue with the prosthetics, had some Xrays done of the hips. Also had some pain in his back so he had Xrays of the back as well in July. Plan with ortho was to have PT and cortisone injections. However, lower left leg was swollen at the time, so ortho ordered LE Dopplers. DVT found; sent to [facility]. Admitted, found to have a small PE as well. Started on Lovenox, discharged 7/23/22 on Eliquis. He was supposed to get cortisone shots for his lumbar radiculopathy, but cannot due to the Eliquis. \par \par Denies chest pain, sob however states when he ambulates, he starts to feel numbness in the leg and heaviiness. \par Apparently, when back got worse, he felt pain there with shooting pain to leg which he still has however the numbness, fullness is new in the left lower leg. No ulcers.No leg pain. \par \par Repeat scan of DVT in Sept 21: improved clot burden with now left popliteal and down thrombus. Femoral clear. \par Pending another scan October 22. \par \par Hematology Dr. Goldberg

## 2022-10-14 NOTE — DISCUSSION/SUMMARY
[FreeTextEntry1] : 46M hx b/l total hip replacements, hypertriglyceridemia, gout, lumbar radiculopathy, referred to vascular cardiology for DVT/PE and management of thromboembolic disease. \par \par Acute DVT of Left Lower Extremity\par Recently had left calf swelling July 2022 with LE Dopplers of LLE showing above the knee DVT. Also found to have small PE on CTA chest at [place], started on Lovenox, transitioned to Eliquis on discharge 7/23/22. Ortho/pain mgmt planning on spinal steroid injections but need recommendations about anticoagulation. Repeat DVT scan 9/2022 with improved DVT. \par - Given active above the knee thrombosis and high risk of embolization, would defer spinal injections for now till more time on anticoagulation from most recent thromboembolic disease. \par - agree with hematology for repeat LE Dopplers around 10/22 which would be 3 month placido.If continued improvement at 3 month placido with now 3 months of AC, would recommend Lovenox bridge given his substantial limitation given that he does also have PE and to limit the amount of time off the AC\par -if there is more thrombus/DVT on subsequent study, may need to consider IVC filter if anticoagulation needs to be held\par - c/w Eliquis 5mg BID \par -ace bandage of left leg\par - Hypercoagulable study as per hematology with Factor V Leiden and prothrombin gene mutation testing today\par \par RTO 1 month

## 2022-10-14 NOTE — HISTORY OF PRESENT ILLNESS
[Gradual] : gradual [5] : 5 [4] : 4 [Localized] : localized [Tightness] : tightness [Intermittent] : intermittent [Physical therapy] : physical therapy [Walking] : walking [] : yes [de-identified] : 7-5-22- He is known to have had b/l total hip replacement left in 2010 and right in 2012.\par He notes over the last month  he has had pain lower back with radicular complaints throughout the lower legs. [facility] which he presents with today. he was given naprosyn without help. \par \par pmh gout\par \par works in construction \par \par  [FreeTextEntry1] : b/l knees/back  [de-identified] : physical therapy

## 2022-10-26 ENCOUNTER — NON-APPOINTMENT (OUTPATIENT)
Age: 47
End: 2022-10-26

## 2022-11-02 ENCOUNTER — OUTPATIENT (OUTPATIENT)
Dept: OUTPATIENT SERVICES | Facility: HOSPITAL | Age: 47
LOS: 1 days | Discharge: HOME | End: 2022-11-02

## 2022-11-02 DIAGNOSIS — D64.9 ANEMIA, UNSPECIFIED: ICD-10-CM

## 2022-11-02 DIAGNOSIS — Z96.649 PRESENCE OF UNSPECIFIED ARTIFICIAL HIP JOINT: Chronic | ICD-10-CM

## 2022-11-10 ENCOUNTER — APPOINTMENT (OUTPATIENT)
Dept: HEMATOLOGY ONCOLOGY | Facility: CLINIC | Age: 47
End: 2022-11-10

## 2022-11-10 VITALS
BODY MASS INDEX: 40.56 KG/M2 | OXYGEN SATURATION: 98 % | WEIGHT: 315 LBS | SYSTOLIC BLOOD PRESSURE: 146 MMHG | TEMPERATURE: 98.8 F | HEART RATE: 96 BPM | RESPIRATION RATE: 16 BRPM | DIASTOLIC BLOOD PRESSURE: 94 MMHG

## 2022-11-10 PROCEDURE — 99214 OFFICE O/P EST MOD 30 MIN: CPT

## 2022-11-11 NOTE — HISTORY OF PRESENT ILLNESS
[de-identified] : 46M hx b/l total hip replacements, hypertriglyceridemia, gout, lumbar radiculopathy, referred to hematology for DVT/PE. Initially had PE postoperatively after bilateral hip replacement in 2010. Was started on warfarin, anticoagulated for 3 months, stopped it for 2 weeks, hypercoagulable workup was negative. No events between 2010 and now. He was having pain in the hips, thought it was due to an issue with the prosthetics, had some Xrays done of the hips. Also had some pain in his back so he had Xrays of the back as well in July. Plan with ortho was to have PT and cortisone injections. However, lower left leg was swollen at the time, so ortho ordered LE Dopplers. DVT found; sent to [facility]. Admitted, found to have a small PE as well. Started on Lovenox, discharged 7/23/22 on Eliquis. He was supposed to get cortisone shots for his lumbar radiculopathy, but cannot due to the Eliquis. [de-identified] : Patient presented for follow up on 11/10/2022. Patient felt his usual state of health. The pain persists but he is able to work. No BRBPR. He has been on blood thinners for 3 months as of now. His left leg still has swelling.

## 2022-11-11 NOTE — PHYSICAL EXAM
[Obese] : obese [Normal] : affect appropriate [de-identified] : 1+ pitting edema LLE up to the knee. Trace edema of RLE. Chronic venous stasis changes of both lower LE. [de-identified] : No palpable tenderness [de-identified] : Limited ambulation due to pain from lumbar radiculopathy, but 5/5 strength both LE

## 2022-11-11 NOTE — ASSESSMENT
[FreeTextEntry1] : 47M hx b/l total hip replacements, hypertriglyceridemia, gout, lumbar radiculopathy, referred to hematology for DVT/PE and management of anticoagulation prior to spinal steroid injections.\par \par # Acute DVT of Left Lower Extremity\par Recently had left calf swelling July 2022 with LE Dopplers of LLE showing above the knee DVT. Also found to have small PE on CTA chest at [place], started on Lovenox, transitioned to Eliquis on discharge 7/23/22. Ortho/pain mgmt planning on spinal steroid injections but need recommendations about anticoagulation.\par - Given active above the knee thrombosis and high risk of embolization, had recommended for him to defer spinal injections. However, now s/p 3 months A/C and repeat dopplers, although with persistent obstructive thrombus no propogation and appear similar - likely chronic and endothelialized at this point. Can safely hold Eliquis for 48hrs prior to the spinal injections. Ultimately, patient wishes to avoid epidural for now. \par - Eliquis 5mg BID renewed today\par - Factor V Leiden and prothrombin gene mutation testing- negative. \par -WIll plan for 6 months of extended A/C prior to d-dimer testing and possible withdrawal for further hypercoag work up. \par -Based on patient history this represents a truly UNPROVOKED VTE episode, and is also recurrent. There are no provoking risk factors identified and patient without family history. \par -Patient is a LOW RISK for bleeding complications with Xarelto. His rate of major bleeding would be approximately 0.8% annually at this point (outside the time period from the initial clot). \par -Patient is a HIGH RISK for recurrent VTE given the unprovoked nature of his thrombosis. His rate for recurrence is around 10% in the first year and 5% annually thereafter. \par -Discussed possibility of decreasing to ppx dose after 6 months with D-dimer monitoring. \par RTO in January (after 6 months A/C)

## 2022-11-11 NOTE — REVIEW OF SYSTEMS
[Difficulty Walking] : difficulty walking [Negative] : ENT [Fever] : no fever [Chills] : no chills [Eye Pain] : no eye pain [Red Eyes] : eyes not red [Chest Pain] : no chest pain [Palpitations] : no palpitations [Shortness Of Breath] : no shortness of breath [Wheezing] : no wheezing [Cough] : no cough [Abdominal Pain] : no abdominal pain [Vomiting] : no vomiting [Constipation] : no constipation [Diarrhea] : no diarrhea [Dysuria] : no dysuria [Joint Pain] : no joint pain [Joint Stiffness] : no joint stiffness [Skin Rash] : no skin rash [Anxiety] : no anxiety [Depression] : no depression [Easy Bleeding] : no tendency for easy bleeding [Easy Bruising] : no tendency for easy bruising [Swollen Glands] : no swollen glands [FreeTextEntry9] : Back pain

## 2022-11-18 ENCOUNTER — APPOINTMENT (OUTPATIENT)
Dept: ORTHOPEDIC SURGERY | Facility: CLINIC | Age: 47
End: 2022-11-18

## 2023-01-13 ENCOUNTER — NON-APPOINTMENT (OUTPATIENT)
Age: 48
End: 2023-01-13

## 2023-01-19 ENCOUNTER — APPOINTMENT (OUTPATIENT)
Dept: HEMATOLOGY ONCOLOGY | Facility: CLINIC | Age: 48
End: 2023-01-19

## 2023-01-23 ENCOUNTER — NON-APPOINTMENT (OUTPATIENT)
Age: 48
End: 2023-01-23

## 2023-01-27 ENCOUNTER — OUTPATIENT (OUTPATIENT)
Dept: OUTPATIENT SERVICES | Facility: HOSPITAL | Age: 48
LOS: 1 days | Discharge: HOME | End: 2023-01-27

## 2023-01-27 DIAGNOSIS — D64.9 ANEMIA, UNSPECIFIED: ICD-10-CM

## 2023-01-27 DIAGNOSIS — Z96.649 PRESENCE OF UNSPECIFIED ARTIFICIAL HIP JOINT: Chronic | ICD-10-CM

## 2023-01-31 ENCOUNTER — APPOINTMENT (OUTPATIENT)
Dept: HEMATOLOGY ONCOLOGY | Facility: CLINIC | Age: 48
End: 2023-01-31

## 2023-02-02 ENCOUNTER — RESULT REVIEW (OUTPATIENT)
Age: 48
End: 2023-02-02

## 2023-02-02 ENCOUNTER — APPOINTMENT (OUTPATIENT)
Dept: HEMATOLOGY ONCOLOGY | Facility: CLINIC | Age: 48
End: 2023-02-02
Payer: COMMERCIAL

## 2023-02-02 VITALS
OXYGEN SATURATION: 96 % | HEIGHT: 78 IN | WEIGHT: 315 LBS | BODY MASS INDEX: 36.45 KG/M2 | RESPIRATION RATE: 16 BRPM | DIASTOLIC BLOOD PRESSURE: 98 MMHG | HEART RATE: 91 BPM | SYSTOLIC BLOOD PRESSURE: 157 MMHG | TEMPERATURE: 96.4 F

## 2023-02-02 DIAGNOSIS — M51.16 INTERVERTEBRAL DISC DISORDERS WITH RADICULOPATHY, LUMBAR REGION: ICD-10-CM

## 2023-02-02 LAB
BASOPHILS # BLD AUTO: 0.03 K/UL — SIGNIFICANT CHANGE UP (ref 0–0.2)
BASOPHILS NFR BLD AUTO: 0.6 % — SIGNIFICANT CHANGE UP (ref 0–2)
EOSINOPHIL # BLD AUTO: 0.16 K/UL — SIGNIFICANT CHANGE UP (ref 0–0.5)
EOSINOPHIL NFR BLD AUTO: 3.3 % — SIGNIFICANT CHANGE UP (ref 0–6)
HCT VFR BLD CALC: 41.9 % — SIGNIFICANT CHANGE UP (ref 39–50)
HGB BLD-MCNC: 14.1 G/DL — SIGNIFICANT CHANGE UP (ref 13–17)
IMM GRANULOCYTES NFR BLD AUTO: 0.2 % — SIGNIFICANT CHANGE UP (ref 0–0.9)
LYMPHOCYTES # BLD AUTO: 2.08 K/UL — SIGNIFICANT CHANGE UP (ref 1–3.3)
LYMPHOCYTES # BLD AUTO: 43.4 % — SIGNIFICANT CHANGE UP (ref 13–44)
MCHC RBC-ENTMCNC: 32.2 PG — SIGNIFICANT CHANGE UP (ref 27–34)
MCHC RBC-ENTMCNC: 33.7 G/DL — SIGNIFICANT CHANGE UP (ref 32–36)
MCV RBC AUTO: 95.7 FL — SIGNIFICANT CHANGE UP (ref 80–100)
MONOCYTES # BLD AUTO: 0.58 K/UL — SIGNIFICANT CHANGE UP (ref 0–0.9)
MONOCYTES NFR BLD AUTO: 12.1 % — SIGNIFICANT CHANGE UP (ref 2–14)
NEUTROPHILS # BLD AUTO: 1.93 K/UL — SIGNIFICANT CHANGE UP (ref 1.8–7.4)
NEUTROPHILS NFR BLD AUTO: 40.4 % — LOW (ref 43–77)
NRBC # BLD: 0 /100 WBCS — SIGNIFICANT CHANGE UP (ref 0–0)
PLATELET # BLD AUTO: 241 K/UL — SIGNIFICANT CHANGE UP (ref 150–400)
RBC # BLD: 4.38 M/UL — SIGNIFICANT CHANGE UP (ref 4.2–5.8)
RBC # FLD: 13.5 % — SIGNIFICANT CHANGE UP (ref 10.3–14.5)
WBC # BLD: 4.79 K/UL — SIGNIFICANT CHANGE UP (ref 3.8–10.5)
WBC # FLD AUTO: 4.79 K/UL — SIGNIFICANT CHANGE UP (ref 3.8–10.5)

## 2023-02-02 PROCEDURE — 99214 OFFICE O/P EST MOD 30 MIN: CPT

## 2023-02-03 ENCOUNTER — NON-APPOINTMENT (OUTPATIENT)
Age: 48
End: 2023-02-03

## 2023-02-03 ENCOUNTER — APPOINTMENT (OUTPATIENT)
Dept: CARDIOLOGY | Facility: CLINIC | Age: 48
End: 2023-02-03
Payer: COMMERCIAL

## 2023-02-03 VITALS
DIASTOLIC BLOOD PRESSURE: 90 MMHG | HEART RATE: 77 BPM | WEIGHT: 315 LBS | OXYGEN SATURATION: 97 % | HEIGHT: 78 IN | SYSTOLIC BLOOD PRESSURE: 126 MMHG | BODY MASS INDEX: 36.45 KG/M2

## 2023-02-03 PROCEDURE — 99215 OFFICE O/P EST HI 40 MIN: CPT

## 2023-02-03 PROCEDURE — 93000 ELECTROCARDIOGRAM COMPLETE: CPT

## 2023-02-03 NOTE — HISTORY OF PRESENT ILLNESS
[FreeTextEntry1] : Reshma Lynne is a 46M hx b/l total hip replacements, hypertriglyceridemia, gout, lumbar radiculopathy, who presents today for follow up post DVT/PE. Initially had PE postoperatively after bilateral hip replacement in 2010. Was started on warfarin, anticoagulated for 3 months, stopped it for 2 weeks, hypercoagulable workup was negative. No events between 2010 and now. He was having pain in the hips, thought it was due to an issue with the prosthetics, had some Xrays done of the hips. Also had some pain in his back so he had Xrays of the back as well in July. Plan with ortho was to have PT and cortisone injections. However, lower left leg was swollen at the time, so ortho ordered LE Dopplers. DVT found; sent to [place] [facility]. Admitted, found to have a small PE as well. Started on Lovenox, discharged 7/23/22 on Eliquis. He was supposed to get cortisone shots for his lumbar radiculopathy, but cannot due to the Eliquis. \par \par Denies chest pain, sob however states when he ambulates, he starts to feel numbness in the leg and heaviiness. \par Apparently, when back got worse, he felt pain there with shooting pain to leg which he still has however the numbness, fullness is new in the left lower leg. No ulcers.No leg pain. \par \par Repeat scan of DVT in Sept 21: improved clot burden with now left popliteal and down thrombus. Femoral clear. \par Jan 2023: still with left pop, gastroc and L PTV chronic DVT \par \par \par Hematology Dr. Goldberg

## 2023-02-03 NOTE — CARDIOLOGY SUMMARY
[de-identified] : Sept 21 2022: Left sided popliteal and down thrombus (no femoral involvement) \par \par [id number] 05:44PM \par DOPPLER VENOUS LEFT LOWER EXTREMITY \par DOPPLER VENOUS LEFT LOWER EXTREMITY: EXAM: DOPPLER VENOUS LEFT LOWER EXTREMITY\par \par HISTORY: M79.662 Left lower leg pain\par \par \par COMPARISON: None.\par \par FINDINGS:\par Duplex and color-flow Doppler imaging of the lower extremity venous system was\par performed. There is thrombus noted in the distal femoral vein, popliteal vein,\par proximal posterior tibial vein and left gastrocnemius vein. The remainder of the\par left lower extremity deep venous system is patent.\par \par There is no Baker's cyst.\par \par IMPRESSION:\par Positive deep venous thrombosis.\par \par Signed by: Cassy Edwards MD\par  Signed Date: 7/21/2022 5:51 PM EDT

## 2023-02-03 NOTE — DISCUSSION/SUMMARY
[FreeTextEntry1] : 46M hx b/l total hip replacements, hypertriglyceridemia, gout, lumbar radiculopathy, referred to vascular cardiology for DVT/PE and management of thromboembolic disease. \par \par Acute DVT of Left Lower Extremity\par Recently had left calf swelling July 2022 with LE Dopplers of LLE showing above the knee DVT. Also found to have small PE on CTA chest at [place], started on Lovenox, transitioned to Eliquis on discharge 7/23/22. Ortho/pain mgmt planning on spinal steroid injections but need recommendations about anticoagulation. Repeat DVT scan 9/2022 with improved DVT. Repeat now shows chronic changes but same spot. D dimer negative at 179.\par - Given active above the knee thrombosis and high risk of embolization, wanted to defer spinal injections for now till more time on anticoagulation from most recent thromboembolic disease. \par -now that he is at 6 months, he can proceed with injection but would continue anticoagulation around procedure. No bridge required. \par -no indication for IVC filter if anticoagulation needs to be held\par -low Villata score, no pain in the lower leg, baseline swelling \par -agree with hematology\par - c/w Eliquis 5mg BID \par -ace bandage of left leg\par - Hypercoagulable study as per hematology with Factor V Leiden and prothrombin gene mutation testing today\par \par RTO 1 month [EKG obtained to assist in diagnosis and management of assessed problem(s)] : EKG obtained to assist in diagnosis and management of assessed problem(s)

## 2023-02-04 PROBLEM — M51.16 LUMBAR DISC DISEASE WITH RADICULOPATHY: Status: ACTIVE | Noted: 2022-07-05

## 2023-02-04 NOTE — ASSESSMENT
[FreeTextEntry1] : 47M hx b/l total hip replacements, hypertriglyceridemia, gout, lumbar radiculopathy, referred to hematology for DVT/PE and management of anticoagulation prior to spinal steroid injections.\par \par # Acute DVT of Left Lower Extremity\par Recently had left calf swelling July 2022 with LE Dopplers of LLE showing above the knee DVT. Also found to have small PE on CTA chest at [place], started on Lovenox, transitioned to Eliquis on discharge 7/23/22. Ortho/pain mgmt planning on spinal steroid injections but need recommendations about anticoagulation.\par - Given active above the knee thrombosis and high risk of embolization, had recommended for him to defer spinal injections. However, after 3 months A/C repeat dopplers, although with persistent obstructive thrombus -no propogation and appear similar - likely chronic and endothelialized at this point. Can safely hold Eliquis for 48hrs prior to the spinal injections. 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 Repeat doppler on 1/14/23 showing chronic DVT. \par - Eliquis 5mg BID continues at this point, but will draw D-dimer today and if negative can consider de-escalation to 2.5 mg BID as ppx. \par - Factor V Leiden and prothrombin gene mutation testing- negative. \par -In addition, if D-dimer negative can consider holding Eliquis for 2 weeks and checking complete workup includine protein C, S and AT3, as well as APLS profile. Unlikely to  though at this point. \par -Based on patient history this represents a truly UNPROVOKED VTE episode, and is also recurrent. There are no provoking risk factors identified and patient without family history. \par -Patient is a LOW RISK for bleeding complications with Xarelto. His rate of major bleeding would be approximately 0.8% annually at this point (outside the time period from the initial clot). \par -Patient is a HIGH RISK for recurrent VTE given the unprovoked nature of his thrombosis. His rate for recurrence is around 10% in the first year and 5% annually thereafter. \par RTC in 3 months.

## 2023-02-04 NOTE — PHYSICAL EXAM
[Fully active, able to carry on all pre-disease performance without restriction] : Status 0 - Fully active, able to carry on all pre-disease performance without restriction [Obese] : obese [Normal] : affect appropriate [de-identified] : trace edema in LLE.  [de-identified] : No palpable tenderness [de-identified] : Limited ambulation due to pain from lumbar radiculopathy, but 5/5 strength both LE

## 2023-02-04 NOTE — HISTORY OF PRESENT ILLNESS
[de-identified] : 46M hx b/l total hip replacements, hypertriglyceridemia, gout, lumbar radiculopathy, referred to hematology for DVT/PE. Initially had PE postoperatively after bilateral hip replacement in 2010. Was started on warfarin, anticoagulated for 3 months, stopped it for 2 weeks, hypercoagulable workup was negative. No events between 2010 and now. He was having pain in the hips, thought it was due to an issue with the prosthetics, had some Xrays done of the hips. Also had some pain in his back so he had Xrays of the back as well in July. Plan with ortho was to have PT and cortisone injections. However, lower left leg was swollen at the time, so ortho ordered LE Dopplers. DVT found; sent to [facility]. Admitted, found to have a small PE as well. Started on Lovenox, discharged 7/23/22 on Eliquis. He was supposed to get cortisone shots for his lumbar radiculopathy, but cannot due to the Eliquis. [de-identified] : Patient seen for follow up on 02/02/2023. He has been on 6 months of anticoagulation at this point. Repeat doppler on her LLE on 1/14/23 showed evidence of chronic DVT. No new thrombosis or worsening of underlying thrombosis. Patient feels well at this time. Still with lower back pain, and he still does wish to get epidural injection in preparation for the summer when he will have to work more rigorously. LLE still with trace edema but much improved.

## 2023-02-04 NOTE — REVIEW OF SYSTEMS
[Fever] : no fever [Chills] : no chills [Eye Pain] : no eye pain [Red Eyes] : eyes not red [Chest Pain] : no chest pain [Palpitations] : no palpitations [Shortness Of Breath] : no shortness of breath [Wheezing] : no wheezing [Cough] : no cough [Abdominal Pain] : no abdominal pain [Vomiting] : no vomiting [Constipation] : no constipation [Diarrhea] : no diarrhea [Dysuria] : no dysuria [Joint Pain] : no joint pain [Joint Stiffness] : no joint stiffness [Difficulty Walking] : difficulty walking [Skin Rash] : no skin rash [Anxiety] : no anxiety [Depression] : no depression [Easy Bleeding] : no tendency for easy bleeding [Easy Bruising] : no tendency for easy bruising [Negative] : ENT [Swollen Glands] : no swollen glands [FreeTextEntry9] : Back pain

## 2023-02-06 ENCOUNTER — APPOINTMENT (OUTPATIENT)
Dept: HEMATOLOGY ONCOLOGY | Facility: CLINIC | Age: 48
End: 2023-02-06
Payer: COMMERCIAL

## 2023-02-06 LAB — DEPRECATED D DIMER PPP IA-ACNC: 177 NG/ML DDU

## 2023-02-06 PROCEDURE — 99441: CPT

## 2023-02-09 NOTE — H&P ADULT - NSTOBACCOSCREENHP_GEN_A_CS
preop exam completed at this time  No additional lab required at this time  Patient is currently medically optimized for her up coming procedure  Based on RCRI, there is a 0 4% chance of major adverse cardiac event
No

## 2023-03-04 NOTE — REVIEW OF SYSTEMS
[Difficulty Walking] : difficulty walking [Negative] : ENT [Fever] : no fever [Chills] : no chills [Eye Pain] : no eye pain [Red Eyes] : eyes not red [Chest Pain] : no chest pain [Palpitations] : no palpitations [Wheezing] : no wheezing [Shortness Of Breath] : no shortness of breath [Cough] : no cough [Abdominal Pain] : no abdominal pain [Vomiting] : no vomiting [Constipation] : no constipation [Diarrhea] : no diarrhea [Dysuria] : no dysuria [Joint Pain] : no joint pain [Joint Stiffness] : no joint stiffness [Skin Rash] : no skin rash [Anxiety] : no anxiety [Depression] : no depression [Easy Bleeding] : no tendency for easy bleeding [Easy Bruising] : no tendency for easy bruising [Swollen Glands] : no swollen glands [FreeTextEntry9] : Back pain No

## 2023-03-23 ENCOUNTER — APPOINTMENT (OUTPATIENT)
Dept: CARDIOLOGY | Facility: CLINIC | Age: 48
End: 2023-03-23
Payer: COMMERCIAL

## 2023-03-23 ENCOUNTER — APPOINTMENT (OUTPATIENT)
Dept: PAIN MANAGEMENT | Facility: CLINIC | Age: 48
End: 2023-03-23
Payer: COMMERCIAL

## 2023-03-23 VITALS — WEIGHT: 315 LBS | HEIGHT: 78 IN | BODY MASS INDEX: 36.45 KG/M2

## 2023-03-23 PROCEDURE — 99214 OFFICE O/P EST MOD 30 MIN: CPT

## 2023-03-23 PROCEDURE — 99214 OFFICE O/P EST MOD 30 MIN: CPT | Mod: 95

## 2023-03-23 NOTE — HISTORY OF PRESENT ILLNESS
[Lower back] : lower back [10] : 10 [8] : 8 [Dull/Aching] : dull/aching [Radiating] : radiating [Tingling] : tingling [Tightness] : tightness [Frequent] : frequent [Household chores] : household chores [Leisure] : leisure [Work] : work [Sleep] : sleep [Social interactions] : social interactions [Meds] : meds [Physical therapy] : physical therapy [Sitting] : sitting [Standing] : standing [Bending forward] : bending forward [Walking] : walking [Stairs] : stairs [FreeTextEntry1] : 3/23/23- Pain is returning in low back.  Had a DVT in July and is on Eliquis .  Will get clearance\par \par 7/28/22-Back pain with radiation to bilateral lower ext.. Pain began approx. 1 month ago, sudden onset, no known trigger event, but he works as a construction for roads. Denies bowel/bladder sympotms. Prior ELANA 10 years ago bilaterally. Course complicated at that time by a DVT and this course complicated again by a DVT seen on STAT doppler 7-21-22 now on Eliquis. MRI reviewed. Pain is worsened with walking. Pain improves with sitting and rest.  [] : no [FreeTextEntry7] : b/l legs [de-identified] :

## 2023-03-23 NOTE — ASSESSMENT
[FreeTextEntry1] : Patient is presenting with acute/sub-acute radicular pain with impairment in ADLs and functionality.  The pain has not responded to  conservative care including nsaid therapy and/or physical therapy.  There is no bleeding tendency, unstable medical condition, or systemic infection.\par \par After discussing various treatment options with the patient including but not limited to oral medications, physical therapy, exercise, modalities as well as interventional spinal injections, we have decided with the following plan:\par I personally reviewed the MRI/CT scan images and agree with the radiologist's report. The radiological findings were discussed with the patient.\par The risks, benefits, contents and alternatives to injection were explained in full to the patient. Risks outlined include but are not limited to infection,sepsis, bleeding, post-dural puncture headache, nerve damage, temporary increase in pain, syncopal episode, failure to resolve symptoms, allergic reaction, symptom recurrence, and elevation of blood sugar in diabetics. Cortisone may cause immunosuppression. Patient understands the risks. All questions were answered. After discussion of options, patient requested an injection. Information regarding the injection was given to the patient. Which medications to stop prior to the injection was explained to the patient as well.\par Follow up in 1-2 weeks post injection for re-evaluation. \par Continue Home exercises, stretching, activity modification, physical therapy, and conservative care.\par Patient is presenting with acute/sub-acute radicular pain with impairment in ADLs and functionality. The pain has not responded to conservative care including nsaid therapy and/or physical therapy. There is no bleeding tendency, unstable medical condition, or systemic infection.\par \par B/L L4-L5 TFESI.  Needs clearance

## 2023-03-24 NOTE — HISTORY OF PRESENT ILLNESS
[FreeTextEntry1] : Reshma Lynne is a 46M hx b/l total hip replacements, hypertriglyceridemia, gout, lumbar radiculopathy, who presents today for follow up post DVT/PE. Initially had PE postoperatively after bilateral hip replacement in 2010. Was started on warfarin, anticoagulated for 3 months, stopped it for 2 weeks, hypercoagulable workup was negative. No events between 2010 and now. He was having pain in the hips, thought it was due to an issue with the prosthetics, had some Xrays done of the hips. Also had some pain in his back so he had Xrays of the back as well in July. Plan with ortho was to have PT and cortisone injections. However, lower left leg was swollen at the time, so ortho ordered LE Dopplers. DVT found; sent to [place] [facility]. Admitted, found to have a small PE as well. Started on Lovenox, discharged 7/23/22 on Eliquis. He was supposed to get cortisone shots for his lumbar radiculopathy, but cannot due to the Eliquis. \par \par Denies chest pain, sob however states when he ambulates, he starts to feel numbness in the leg and heaviiness. \par Apparently, when back got worse, he felt pain there with shooting pain to leg which he still has however the numbness, fullness is new in the left lower leg. No ulcers.No leg pain. \par \par Repeat scan of DVT in Sept 21: improved clot burden with now left popliteal and down thrombus. Femoral clear. \par Jan 2023: still with left pop, gastroc and L PTV chronic DVT \par Hematology Dr. Goldberg\par Seeing pain management given return of back pain.  [Home] : at home, [unfilled] , at the time of the visit. [Medical Office: ([facility])___] : at the medical office located in  [Verbal consent obtained from patient] : the patient, [unfilled]

## 2023-03-24 NOTE — DISCUSSION/SUMMARY
[FreeTextEntry1] : 46M hx b/l total hip replacements, hypertriglyceridemia, gout, lumbar radiculopathy, referred to vascular cardiology for DVT/PE and management of thromboembolic disease. \par \par Acute DVT of Left Lower Extremity\par Recently had left calf swelling July 2022 with LE Dopplers of LLE showing above the knee DVT. Also found to have small PE on CTA chest at [place], started on Lovenox, transitioned to Eliquis on discharge 7/23/22. Ortho/pain mgmt planning on spinal steroid injections but need recommendations about anticoagulation. Repeat DVT scan 9/2022 with improved DVT. Repeat now shows chronic changes but same spot. D dimer negative at 179.\par - Given active above the knee thrombosis and high risk of embolization, wanted to defer spinal injections for now till more time on anticoagulation from most recent thromboembolic disease. \par -now that he is at 6 months, he can proceed with injection but would continue anticoagulation around procedure. No bridge required. \par -no indication for IVC filter if anticoagulation needs to be held\par -low Villata score, no pain in the lower leg, baseline swelling \par -agree with hematology\par - c/w Eliquis 5mg BID \par -ace bandage of left leg\par - Hypercoagulable study as per hematology with Factor V Leiden and prothrombin gene mutation testing today\par \par RTO 3 months

## 2023-04-07 ENCOUNTER — APPOINTMENT (OUTPATIENT)
Dept: PAIN MANAGEMENT | Facility: CLINIC | Age: 48
End: 2023-04-07
Payer: COMMERCIAL

## 2023-04-07 PROCEDURE — 64483 NJX AA&/STRD TFRM EPI L/S 1: CPT | Mod: RT

## 2023-04-07 NOTE — PROCEDURE
[FreeTextEntry3] : Date of Service:  04/07/2023 \par \par Account: [id number] \par \par Patient:  JANENE GUO \par  \par YOB: 1975 \par \par Age:  47 year \par     \par Surgeon:                              Ravinder Reyes M.D.\par \par Assistant:                              None.\par \par Pre-Operative Diagnosis:     Lumbosacral Radiculitis (M54.17)                  \par  \par Post Operative Diagnosis:    Lumbosacral Radiculitis (M54.17)                 \par  \par Procedure:                              Right L4-5 transforaminal epidural steroid injection\par                                                   Left L4-5 transforaminal epidural steroid injection under fluoroscopic guidance. \par \par Anesthesia:                             MAC\par \par This procedure was carried out using fluoroscopic guidance.  The risks and benefits of the procedure were discussed extensively with the patient.  The consent of the patient was obtained and the following procedure was performed. The patient was placed in the prone position on the fluoroscopic table and the lumbar area was prepped and draped in a sterile fashion.\par \par The left L4-5 neural foramen was then identified on left oblique  "marino dog" anatomical view at the 6 o' clock position using fluoroscopic guidance, and the area was marked. The overlying skin and subcutaneous structures were anesthetized using sterile technique with 1% Lidocaine.  A 22 gauge spinal needle was directed toward the inferior (6 o'clock) position of the pedicle, which formed the roof of the identified foramen.  Once in the epidural space, after negative aspiration for heme and CSF, 1cc of Omnipaque contrast was injected to confirm epidural location and assess filling defects and rule out intravascular needle placement. \par \par The following contrast flow observed: no intravascular or intrathecal flow pattern was noted.  No blood or CSF was aspirated. Omnipaque spread medially in epidural space.\par \par After this, an injectate of 3 cc preservative free normal saline plus 40 mg of depo-medrol was injected in the epidural space.\par \par The right L4-5 neural foramen was then identified on right oblique "marino dog" anatomical view at the 6 o' clock position using fluoroscopic guidance, and the area was marked. The overlying skin and subcutaneous structures were anesthetized using sterile technique with 1% Lidocaine.  A 22 gauge spinal needle was directed toward the inferior (6o'clock) position of the pedicle, which formed the roof of the identified foramen.  Once in the epidural space, after negative aspiration for heme and CSF, 1cc of Omnipaque contrast was injected to confirm epidural location and assess filling defects and rule out intravascular needle placement. \par \par The following contrast flow observed: no intravascular or intrathecal flow pattern was noted.  No blood or CSF was aspirated. Omnipaque spread medially in epidural space. After this, an injectate of 3 cc preservative free normal saline plus 40 mg of depo-medrol was injected in the epidural space.\par \par The needle was subsequently removed.  Vital signs remained normal.  Pulse oximeter was used throughout the procedure and the patient's pulse and oxygen saturation remained within normal limits.  The patient tolerated the procedure well.  There were no complications.  The patient was instructed to apply ice over the injection sites for twenty minutes every two hours for the next 24 to 48 hours.  The patient was also instructed to contact me immediately if there were any problems.\par \par Ravinder Reyes M.D.\par

## 2023-04-20 ENCOUNTER — APPOINTMENT (OUTPATIENT)
Dept: PAIN MANAGEMENT | Facility: CLINIC | Age: 48
End: 2023-04-20
Payer: COMMERCIAL

## 2023-04-20 VITALS — BODY MASS INDEX: 36.45 KG/M2 | HEIGHT: 78 IN | WEIGHT: 315 LBS

## 2023-04-20 PROCEDURE — 99214 OFFICE O/P EST MOD 30 MIN: CPT

## 2023-04-20 NOTE — HISTORY OF PRESENT ILLNESS
[Lower back] : lower back [9] : 9 [5] : 5 [Dull/Aching] : dull/aching [Radiating] : radiating [Tightness] : tightness [Tingling] : tingling [Frequent] : frequent [Household chores] : household chores [Work] : work [Leisure] : leisure [Social interactions] : social interactions [Meds] : meds [Ice] : ice [Physical therapy] : physical therapy [Sitting] : sitting [Standing] : standing [Walking] : walking [Bending forward] : bending forward [Stairs] : stairs [FreeTextEntry1] : 3/23/23- Pain is returning in low back.  Had a DVT in July and is on Eliquis .  Will get clearance\par \par 7/28/22-Back pain with radiation to bilateral lower ext.. Pain began approx. 1 month ago, sudden onset, no known trigger event, but he works as a construction for roads. Denies bowel/bladder sympotms. Prior ELANA 10 years ago bilaterally. Course complicated at that time by a DVT and this course complicated again by a DVT seen on STAT doppler 7-21-22 now on Eliquis. MRI reviewed. Pain is worsened with walking. Pain improves with sitting and rest.  [] : no [FreeTextEntry7] : b/l legs [de-identified] :

## 2023-04-20 NOTE — DISCUSSION/SUMMARY
[Surgical risks reviewed] : Surgical risks reviewed [de-identified] : After discussing various treatment options with the patient including but not limited to oral medications, physical therapy, exercise,\par modalities as well as interventional spinal injections, we have decided with the following plan\par \par I personally reviewed the MRI/CT scan images and agree with the radiologist's report. The\par radiological findings were discussed with the patient.\par \par \par The risks, benefits, contents and alternatives to injection were explained in full to the patient. Risks outlined include but are not\par limited to infection,sepsis, bleeding, post-dural puncture headache, nerve damage, temporary increase in pain, syncopal episode,\par failure to resolve symptoms, allergic reaction, symptom recurrence, and elevation of blood sugar in diabetics. Cortisone may cause\par immunosuppression. Patient understands the risks. All questions were answered. After discussion of options, patient requested\par an injection. Information regarding the injection was given to the patient. Which medications to stop prior to the injection was\par explained to the patient as well.\par \par Follow up in 1-2 weeks post injection for re-evaluation.\par \par  Conservative Care\par Continue Home exercises, stretching, activity modification, physical therapy, and conservative care.\par \par Proceed with Bilateral 4/5 TFESI ( allan)\par

## 2023-04-20 NOTE — ASSESSMENT
[FreeTextEntry1] : s/p Bilateral 4/5 TFESI 4-7-23\par \par \par The patient reports a reduction in their pain score based on VAS post procedure compared to that of pre procedure. The current VAS scale has been reported by the patient and recorded in the chart in the vital signs section under pain assessment. \par \par In addition the patient notes a greater ability to perform activities of daily living ( ADL's) which include but not limited to self care, bathing, dressing, food prep and household cleaning which were previously limited due to pain.\par \par Pt reports 70% relief from injection, he has returned to work full duty and this was without consequence. \par \par Will start PT and proceed with repeat injection.

## 2023-04-26 ENCOUNTER — OUTPATIENT (OUTPATIENT)
Dept: OUTPATIENT SERVICES | Facility: HOSPITAL | Age: 48
LOS: 1 days | Discharge: HOME | End: 2023-04-26

## 2023-04-26 DIAGNOSIS — D64.9 ANEMIA, UNSPECIFIED: ICD-10-CM

## 2023-04-26 DIAGNOSIS — Z96.649 PRESENCE OF UNSPECIFIED ARTIFICIAL HIP JOINT: Chronic | ICD-10-CM

## 2023-05-04 ENCOUNTER — APPOINTMENT (OUTPATIENT)
Dept: HEMATOLOGY ONCOLOGY | Facility: CLINIC | Age: 48
End: 2023-05-04
Payer: COMMERCIAL

## 2023-05-04 VITALS — DIASTOLIC BLOOD PRESSURE: 82 MMHG | SYSTOLIC BLOOD PRESSURE: 164 MMHG

## 2023-05-04 VITALS
WEIGHT: 315 LBS | HEART RATE: 88 BPM | OXYGEN SATURATION: 95 % | RESPIRATION RATE: 16 BRPM | BODY MASS INDEX: 40.78 KG/M2 | TEMPERATURE: 98.4 F | DIASTOLIC BLOOD PRESSURE: 100 MMHG | SYSTOLIC BLOOD PRESSURE: 166 MMHG

## 2023-05-04 PROCEDURE — 99214 OFFICE O/P EST MOD 30 MIN: CPT

## 2023-05-10 NOTE — ASSESSMENT
[FreeTextEntry1] : 47M hx b/l total hip replacements, hypertriglyceridemia, gout, lumbar radiculopathy, referred to hematology for DVT/PE and management of anticoagulation prior to spinal steroid injections.\par \par # Acute DVT of Left Lower Extremity\par Recently had left calf swelling July 2022 with LE Dopplers of LLE showing above the knee DVT. Also found to have small PE on CTA chest at [place], started on Lovenox, transitioned to Eliquis on discharge 7/23/22. Ortho/pain mgmt planning on spinal steroid injections but need recommendations about anticoagulation.\par - Given active above the knee thrombosis and high risk of embolization, had recommended for him to defer spinal injections. However, after 3 months A/C repeat dopplers, although with persistent obstructive thrombus -no propogation and appear similar - likely chronic and endothelialized at this point. Can safely hold Eliquis for 48hrs prior to the spinal injections .Repeat doppler on 1/14/23 showing chronic DVT. \par - Eliquis de-escalated to 2.5 mg BID as ppx.\par - Factor V Leiden and prothrombin gene mutation testing- negative. \par -Will check d-dimer again today given lower ppx dose. \par -If D-dimer negative can consider holding Eliquis for 1 week and checking complete workup includine protein C, S and AT3, as well as APLS profile. Unlikely to  though at this point. \par -Based on patient history this represents a truly UNPROVOKED VTE episode, and is also recurrent. There are no provoking risk factors identified and patient without family history. \par -Patient is a LOW RISK for bleeding complications with Xarelto. His rate of major bleeding would be approximately 0.8% annually at this point (outside the time period from the initial clot). \par -Patient is a HIGH RISK for recurrent VTE given the unprovoked nature of his thrombosis. His rate for recurrence is around 10% in the first year and 5% annually thereafter. \par RTC in 6 months.

## 2023-05-10 NOTE — PHYSICAL EXAM
[Fully active, able to carry on all pre-disease performance without restriction] : Status 0 - Fully active, able to carry on all pre-disease performance without restriction [Obese] : obese [Normal] : affect appropriate [de-identified] : trace edema in LLE.  [de-identified] : No palpable tenderness [de-identified] : Limited ambulation due to pain from lumbar radiculopathy, but 5/5 strength both LE

## 2023-05-10 NOTE — HISTORY OF PRESENT ILLNESS
[de-identified] : 46M hx b/l total hip replacements, hypertriglyceridemia, gout, lumbar radiculopathy, referred to hematology for DVT/PE. Initially had PE postoperatively after bilateral hip replacement in 2010. Was started on warfarin, anticoagulated for 3 months, stopped it for 2 weeks, hypercoagulable workup was negative. No events between 2010 and now. He was having pain in the hips, thought it was due to an issue with the prosthetics, had some Xrays done of the hips. Also had some pain in his back so he had Xrays of the back as well in July. Plan with ortho was to have PT and cortisone injections. However, lower left leg was swollen at the time, so ortho ordered LE Dopplers. DVT found; sent to [place] ER. Admitted, found to have a small PE as well. Started on Lovenox, discharged 7/23/22 on Eliquis. He was supposed to get cortisone shots for his lumbar radiculopathy, but cannot due to the Eliquis. [de-identified] : Patient seen for follow up on 05/04/2023. Leg feels OK, but still a little numbness in his feet from time to time. He is going to do physical therapy. Denied any chest pain, but will have dyspnea on significant exertion. Had epidural injection which hasn't really worked, but he is back to work.

## 2023-07-03 ENCOUNTER — NON-APPOINTMENT (OUTPATIENT)
Age: 48
End: 2023-07-03

## 2023-08-07 ENCOUNTER — APPOINTMENT (OUTPATIENT)
Dept: PAIN MANAGEMENT | Facility: CLINIC | Age: 48
End: 2023-08-07
Payer: COMMERCIAL

## 2023-08-07 VITALS — HEIGHT: 78 IN | WEIGHT: 315 LBS | BODY MASS INDEX: 36.45 KG/M2

## 2023-08-07 PROCEDURE — 99214 OFFICE O/P EST MOD 30 MIN: CPT

## 2023-08-07 NOTE — PHYSICAL EXAM

## 2023-08-07 NOTE — HISTORY OF PRESENT ILLNESS
[FreeTextEntry1] : 8/7/23- pt is following up for lower back pain , he was past Dr. Reyes patient , now present continuing care with Dr. Hooper   3/23/23- Pain is returning in low back.  Had a DVT in July and is on Eliquis .  Will get clearance  7/28/22-Back pain with radiation to bilateral lower ext.. Pain began approx. 1 month ago, sudden onset, no known trigger event, but he works as a construction for roads. Denies bowel/bladder sympotms. Prior ELANA 10 years ago bilaterally. Course complicated at that time by a DVT and this course complicated again by a DVT seen on STAT doppler 7-21-22 now on Eliquis. MRI reviewed. Pain is worsened with walking. Pain improves with sitting and rest.  [Lower back] : lower back [9] : 9 [5] : 5 [Dull/Aching] : dull/aching [Radiating] : radiating [Tightness] : tightness [Tingling] : tingling [Frequent] : frequent [Household chores] : household chores [Leisure] : leisure [Work] : work [Social interactions] : social interactions [Meds] : meds [Ice] : ice [Physical therapy] : physical therapy [Sitting] : sitting [Standing] : standing [Walking] : walking [Bending forward] : bending forward [Stairs] : stairs [] : yes [FreeTextEntry7] : b/l legs [de-identified] :

## 2023-11-13 ENCOUNTER — OUTPATIENT (OUTPATIENT)
Dept: OUTPATIENT SERVICES | Facility: HOSPITAL | Age: 48
LOS: 1 days | Discharge: HOME | End: 2023-11-13

## 2023-11-13 DIAGNOSIS — D64.9 ANEMIA, UNSPECIFIED: ICD-10-CM

## 2023-11-13 DIAGNOSIS — Z96.649 PRESENCE OF UNSPECIFIED ARTIFICIAL HIP JOINT: Chronic | ICD-10-CM

## 2023-11-16 ENCOUNTER — RESULT REVIEW (OUTPATIENT)
Age: 48
End: 2023-11-16

## 2023-11-16 ENCOUNTER — APPOINTMENT (OUTPATIENT)
Dept: HEMATOLOGY ONCOLOGY | Facility: CLINIC | Age: 48
End: 2023-11-16
Payer: COMMERCIAL

## 2023-11-16 VITALS
RESPIRATION RATE: 16 BRPM | BODY MASS INDEX: 36.45 KG/M2 | HEIGHT: 78 IN | OXYGEN SATURATION: 95 % | HEART RATE: 90 BPM | DIASTOLIC BLOOD PRESSURE: 93 MMHG | WEIGHT: 315 LBS | TEMPERATURE: 98 F | SYSTOLIC BLOOD PRESSURE: 142 MMHG

## 2023-11-16 DIAGNOSIS — J18.9 PNEUMONIA, UNSPECIFIED ORGANISM: ICD-10-CM

## 2023-11-16 LAB
BASOPHILS # BLD AUTO: 0.04 K/UL — SIGNIFICANT CHANGE UP (ref 0–0.2)
BASOPHILS # BLD AUTO: 0.04 K/UL — SIGNIFICANT CHANGE UP (ref 0–0.2)
BASOPHILS NFR BLD AUTO: 0.5 % — SIGNIFICANT CHANGE UP (ref 0–2)
BASOPHILS NFR BLD AUTO: 0.5 % — SIGNIFICANT CHANGE UP (ref 0–2)
EOSINOPHIL # BLD AUTO: 0.21 K/UL — SIGNIFICANT CHANGE UP (ref 0–0.5)
EOSINOPHIL # BLD AUTO: 0.21 K/UL — SIGNIFICANT CHANGE UP (ref 0–0.5)
EOSINOPHIL NFR BLD AUTO: 2.4 % — SIGNIFICANT CHANGE UP (ref 0–6)
EOSINOPHIL NFR BLD AUTO: 2.4 % — SIGNIFICANT CHANGE UP (ref 0–6)
HCT VFR BLD CALC: 40.7 % — SIGNIFICANT CHANGE UP (ref 39–50)
HCT VFR BLD CALC: 40.7 % — SIGNIFICANT CHANGE UP (ref 39–50)
HGB BLD-MCNC: 14.1 G/DL — SIGNIFICANT CHANGE UP (ref 13–17)
HGB BLD-MCNC: 14.1 G/DL — SIGNIFICANT CHANGE UP (ref 13–17)
IMM GRANULOCYTES NFR BLD AUTO: 0.7 % — SIGNIFICANT CHANGE UP (ref 0–0.9)
IMM GRANULOCYTES NFR BLD AUTO: 0.7 % — SIGNIFICANT CHANGE UP (ref 0–0.9)
LYMPHOCYTES # BLD AUTO: 1.65 K/UL — SIGNIFICANT CHANGE UP (ref 1–3.3)
LYMPHOCYTES # BLD AUTO: 1.65 K/UL — SIGNIFICANT CHANGE UP (ref 1–3.3)
LYMPHOCYTES # BLD AUTO: 19 % — SIGNIFICANT CHANGE UP (ref 13–44)
LYMPHOCYTES # BLD AUTO: 19 % — SIGNIFICANT CHANGE UP (ref 13–44)
MCHC RBC-ENTMCNC: 32.6 PG — SIGNIFICANT CHANGE UP (ref 27–34)
MCHC RBC-ENTMCNC: 32.6 PG — SIGNIFICANT CHANGE UP (ref 27–34)
MCHC RBC-ENTMCNC: 34.6 G/DL — SIGNIFICANT CHANGE UP (ref 32–36)
MCHC RBC-ENTMCNC: 34.6 G/DL — SIGNIFICANT CHANGE UP (ref 32–36)
MCV RBC AUTO: 94.2 FL — SIGNIFICANT CHANGE UP (ref 80–100)
MCV RBC AUTO: 94.2 FL — SIGNIFICANT CHANGE UP (ref 80–100)
MONOCYTES # BLD AUTO: 1.11 K/UL — HIGH (ref 0–0.9)
MONOCYTES # BLD AUTO: 1.11 K/UL — HIGH (ref 0–0.9)
MONOCYTES NFR BLD AUTO: 12.8 % — SIGNIFICANT CHANGE UP (ref 2–14)
MONOCYTES NFR BLD AUTO: 12.8 % — SIGNIFICANT CHANGE UP (ref 2–14)
NEUTROPHILS # BLD AUTO: 5.61 K/UL — SIGNIFICANT CHANGE UP (ref 1.8–7.4)
NEUTROPHILS # BLD AUTO: 5.61 K/UL — SIGNIFICANT CHANGE UP (ref 1.8–7.4)
NEUTROPHILS NFR BLD AUTO: 64.6 % — SIGNIFICANT CHANGE UP (ref 43–77)
NEUTROPHILS NFR BLD AUTO: 64.6 % — SIGNIFICANT CHANGE UP (ref 43–77)
NRBC # BLD: 0 /100 WBCS — SIGNIFICANT CHANGE UP (ref 0–0)
NRBC # BLD: 0 /100 WBCS — SIGNIFICANT CHANGE UP (ref 0–0)
PLATELET # BLD AUTO: 289 K/UL — SIGNIFICANT CHANGE UP (ref 150–400)
PLATELET # BLD AUTO: 289 K/UL — SIGNIFICANT CHANGE UP (ref 150–400)
RBC # BLD: 4.32 M/UL — SIGNIFICANT CHANGE UP (ref 4.2–5.8)
RBC # BLD: 4.32 M/UL — SIGNIFICANT CHANGE UP (ref 4.2–5.8)
RBC # FLD: 12.3 % — SIGNIFICANT CHANGE UP (ref 10.3–14.5)
RBC # FLD: 12.3 % — SIGNIFICANT CHANGE UP (ref 10.3–14.5)
WBC # BLD: 8.68 K/UL — SIGNIFICANT CHANGE UP (ref 3.8–10.5)
WBC # BLD: 8.68 K/UL — SIGNIFICANT CHANGE UP (ref 3.8–10.5)
WBC # FLD AUTO: 8.68 K/UL — SIGNIFICANT CHANGE UP (ref 3.8–10.5)
WBC # FLD AUTO: 8.68 K/UL — SIGNIFICANT CHANGE UP (ref 3.8–10.5)

## 2023-11-16 PROCEDURE — 99214 OFFICE O/P EST MOD 30 MIN: CPT

## 2023-11-17 LAB
ALBUMIN SERPL ELPH-MCNC: 4.5 G/DL
ALP BLD-CCNC: 93 U/L
ALT SERPL-CCNC: 50 U/L
ANION GAP SERPL CALC-SCNC: 14 MMOL/L
AST SERPL-CCNC: 33 U/L
BILIRUB SERPL-MCNC: 0.5 MG/DL
BUN SERPL-MCNC: 13 MG/DL
CALCIUM SERPL-MCNC: 10.1 MG/DL
CHLORIDE SERPL-SCNC: 99 MMOL/L
CO2 SERPL-SCNC: 26 MMOL/L
CREAT SERPL-MCNC: 1.06 MG/DL
EGFR: 88 ML/MIN/1.73M2
GLUCOSE SERPL-MCNC: 111 MG/DL
POTASSIUM SERPL-SCNC: 4.6 MMOL/L
PROT SERPL-MCNC: 8.8 G/DL
SODIUM SERPL-SCNC: 139 MMOL/L

## 2023-11-22 ENCOUNTER — APPOINTMENT (OUTPATIENT)
Dept: PAIN MANAGEMENT | Facility: CLINIC | Age: 48
End: 2023-11-22

## 2023-11-30 ENCOUNTER — APPOINTMENT (OUTPATIENT)
Dept: PULMONOLOGY | Facility: CLINIC | Age: 48
End: 2023-11-30

## 2023-12-01 ENCOUNTER — NON-APPOINTMENT (OUTPATIENT)
Age: 48
End: 2023-12-01

## 2023-12-01 ENCOUNTER — APPOINTMENT (OUTPATIENT)
Dept: CARDIOLOGY | Facility: CLINIC | Age: 48
End: 2023-12-01
Payer: COMMERCIAL

## 2023-12-01 VITALS
WEIGHT: 315 LBS | BODY MASS INDEX: 40.27 KG/M2 | SYSTOLIC BLOOD PRESSURE: 142 MMHG | HEART RATE: 96 BPM | DIASTOLIC BLOOD PRESSURE: 92 MMHG | OXYGEN SATURATION: 99 %

## 2023-12-01 PROCEDURE — 93000 ELECTROCARDIOGRAM COMPLETE: CPT

## 2023-12-01 PROCEDURE — 99215 OFFICE O/P EST HI 40 MIN: CPT

## 2023-12-11 ENCOUNTER — APPOINTMENT (OUTPATIENT)
Dept: PAIN MANAGEMENT | Facility: CLINIC | Age: 48
End: 2023-12-11

## 2024-02-29 ENCOUNTER — APPOINTMENT (OUTPATIENT)
Dept: PAIN MANAGEMENT | Facility: CLINIC | Age: 49
End: 2024-02-29
Payer: COMMERCIAL

## 2024-02-29 ENCOUNTER — RX RENEWAL (OUTPATIENT)
Age: 49
End: 2024-02-29

## 2024-02-29 VITALS — BODY MASS INDEX: 36.45 KG/M2 | WEIGHT: 315 LBS | HEIGHT: 78 IN

## 2024-02-29 PROCEDURE — 99214 OFFICE O/P EST MOD 30 MIN: CPT

## 2024-02-29 RX ORDER — GABAPENTIN 300 MG/1
300 CAPSULE ORAL TWICE DAILY
Qty: 60 | Refills: 1 | Status: ACTIVE | COMMUNITY
Start: 2024-02-29 | End: 1900-01-01

## 2024-02-29 NOTE — DISCUSSION/SUMMARY
[de-identified] : proceed L4-5 LESI  After discussing various treatment options with the patient including but not limited to oral medications, physical therapy, exercise, modalities as well as interventional spinal injections, we have decided with the following plan: I personally reviewed the MRI/CT scan images and agree with the radiologist's report. The radiological findings were discussed with the patient. The risks, benefits, contents and alternatives to injection were explained in full to the patient. Risks outlined include but are not limited to infection,sepsis, bleeding, post-dural puncture headache, nerve damage, temporary increase in pain, syncopal episode, failure to resolve symptoms, allergic reaction, symptom recurrence, and elevation of blood sugar in diabetics. Cortisone may cause immunosuppression. Patient understands the risks. All questions were answered. After discussion of options, patient requested an injection. Information regarding the injection was given to the patient. Which medications to stop prior to the injection was explained to the patient as well. Follow up in 1-2 weeks post injection for re-evaluation. Continue Home exercises, stretching, activity modification, physical therapy, and conservative care. Patient is presenting with acute/sub-acute radicular pain with impairment in ADLs and functionality. The pain has not responded to conservative care including nsaid therapy and/or physical therapy. There is no bleeding tendency, unstable medical condition, or systemic infection.

## 2024-02-29 NOTE — HISTORY OF PRESENT ILLNESS
[FreeTextEntry1] : pt is following up for l spine pain , the pain radiates into the right side it shoots into the right side hip and into the side of the leg and the foot is numb , pins and needles feels it higher  [Lower back] : lower back [9] : 9 [5] : 5 [Dull/Aching] : dull/aching [Radiating] : radiating [Tightness] : tightness [Tingling] : tingling [Constant] : constant [Household chores] : household chores [Leisure] : leisure [Work] : work [Sleep] : sleep [Social interactions] : social interactions [Meds] : meds [Ice] : ice [Physical therapy] : physical therapy [Injection therapy] : injection therapy [Sitting] : sitting [Standing] : standing [Walking] : walking [Bending forward] : bending forward [Stairs] : stairs [Lying in bed] : lying in bed [] : yes [FreeTextEntry7] : right side radiating down , hip and foot , calf  [de-identified] :

## 2024-02-29 NOTE — PHYSICAL EXAM
[de-identified] : PHYSICAL EXAM  Constitutional:  Appears well, no apparent distress Ability to communicate: Normal Respiratory: non-labored breathing Skin: no rash noted Head: normocephalic, atraumatic Neck: no visible thyroid enlargement Eyes: extraocular movements intact Neurologic: alert and oriented x3 Psychiatric: normal mood, affect, and behavior  Lumbar Spine:  Palpation: left and right lumbar paraspinal spasm and left and right lumbar paraspinal tenderness to palpation. ROM: Diminished range of motion in all plains.  Patient notes pain with lateral bending to the left and right. MMT: Motor exam is 5/5 through out bilateral lower extremities. Sensation: Light touch and pain is intact throughout bilateral lower extremities. Reflexes: achilles and patella reflexes are intact and  symmetrical.  No sustained clonus. Special Testing: Positive straight leg raise on the left and right side.  Assessemnt: Radiculopathy of lumbosacral region (M54.17) Myalgia (M79.10)  Plan: After discussing various treatment options with the patient including but not limited to oral medications, physical therapy, exercise modalities as well as interventional spinal injections, we have decided with the following plan: The patient is presenting with acute/sub-acute radicular pain with impairment in ADLs and functionality.  The pain has not responded to conservative care including NSAID therapy and/or physical therapy.  There is no bleeding tendency, unstable medical condition, or systemic infection  The risks, benefits and alternatives of the proposed procedure were explained in detail with the patient.  The risks outlined include but are not limited to infection, bleeding, post dural puncture headache, nerve injury, a temporary increase in pain, failure to resolve symptoms, allergic reaction, symptom recurrence, and possible elevation of blood sugar.  All questions were answered to patient's satisfaction and he/she verbalized an understanding.  Follow up 1-2 weeks post injection foe re-evaluation.  Continue home exercises, stretching, activity modification, physical therapy, and conservative care.

## 2024-03-14 ENCOUNTER — APPOINTMENT (OUTPATIENT)
Dept: PAIN MANAGEMENT | Facility: CLINIC | Age: 49
End: 2024-03-14
Payer: COMMERCIAL

## 2024-03-14 PROCEDURE — 62323 NJX INTERLAMINAR LMBR/SAC: CPT

## 2024-03-15 NOTE — PROCEDURE
[FreeTextEntry3] : Date of Service: 03/14/2024   Account: [id number]   Patient: JANENE GUO   YOB: 1975   Age: 48 year     Surgeon:                                   Nathan Hooper M.D.   Pre-Operative Diagnosis:       Lumbosacral radiculitis                Post Operative Diagnosis:     Lumbosacral radiculitis                Procedure:                                Interlaminar lumbar epidural steroid injection (L4-5) under fluoroscopic guidance        This procedure was carried out using fluoroscopic guidance.  The risks and benefits of the procedure were discussed extensively with the patient.  The consent of the patient was obtained and the following procedure was performed.   The patient was placed in the prone position.  The lumbar area was prepped and draped in a sterile fashion.  Under AP view with slight cephalad-caudad angulation, the L4-5 interspace was identified and marked.  Using sterile technique the superficial skin was anesthetized with 1% Lidocaine without epinephrine.  A 20 gauge Tuohoy needle was advanced into the epidural space under fluoroscopy using kxltg-[id number]-[id number] technique and using loss of resistance at the L4-5 level.  After negative aspiration for heme or CSF, an epidurogram was obtained using 3 cc Omnipaque contrast confirming epidural placement of the needle.   Lumbar epidurogram showed no intrathecal or intravascular spread and showed adequate bilateral epidural spread from L1 to S1 levels.   After this, 5 cc of preservative free normal saline and 80 mg of kenalog were injected into the epidural space.   The needle was subsequently removed.    The patient tolerated the procedure well and was instructed to contact me immediately if there were any problems.     Nathan Hooper M.D.

## 2024-03-25 ENCOUNTER — APPOINTMENT (OUTPATIENT)
Dept: PAIN MANAGEMENT | Facility: CLINIC | Age: 49
End: 2024-03-25
Payer: COMMERCIAL

## 2024-03-25 VITALS — BODY MASS INDEX: 36.45 KG/M2 | WEIGHT: 315 LBS | HEIGHT: 78 IN

## 2024-03-25 PROCEDURE — 99214 OFFICE O/P EST MOD 30 MIN: CPT

## 2024-05-13 ENCOUNTER — OUTPATIENT (OUTPATIENT)
Dept: OUTPATIENT SERVICES | Facility: HOSPITAL | Age: 49
LOS: 1 days | Discharge: HOME | End: 2024-05-13

## 2024-05-13 DIAGNOSIS — Z96.649 PRESENCE OF UNSPECIFIED ARTIFICIAL HIP JOINT: Chronic | ICD-10-CM

## 2024-05-13 DIAGNOSIS — D64.9 ANEMIA, UNSPECIFIED: ICD-10-CM

## 2024-05-16 ENCOUNTER — APPOINTMENT (OUTPATIENT)
Dept: HEMATOLOGY ONCOLOGY | Facility: CLINIC | Age: 49
End: 2024-05-16

## 2024-05-29 ENCOUNTER — APPOINTMENT (OUTPATIENT)
Dept: PAIN MANAGEMENT | Facility: CLINIC | Age: 49
End: 2024-05-29
Payer: COMMERCIAL

## 2024-05-29 DIAGNOSIS — M54.16 RADICULOPATHY, LUMBAR REGION: ICD-10-CM

## 2024-05-29 PROCEDURE — 62323 NJX INTERLAMINAR LMBR/SAC: CPT

## 2024-05-29 NOTE — PROCEDURE
[FreeTextEntry3] : Date of Service: 05/29/2024   Account: [id number]   Patient: JANENE GUO   YOB: 1975   Age: 48 year     Surgeon:                                   Nathan Hooper M.D.   Pre-Operative Diagnosis:       Lumbosacral radiculitis                Post Operative Diagnosis:     Lumbosacral radiculitis                Procedure:                                Interlaminar lumbar epidural steroid injection (L4-5) under fluoroscopic guidance   Anesthesia:                            MAC     This procedure was carried out using fluoroscopic guidance.  The risks and benefits of the procedure were discussed extensively with the patient.  The consent of the patient was obtained and the following procedure was performed.   The patient was placed in the prone position.  The lumbar area was prepped and draped in a sterile fashion.  Under AP view with slight cephalad-caudad angulation, the L4-5 interspace was identified and marked.  Using sterile technique the superficial skin was anesthetized with 1% Lidocaine without epinephrine.  A 20 gauge Tuohoy needle was advanced into the epidural space under fluoroscopy using anaug-sqqmahple-quijq technique and using loss of resistance at the L4-5 level.  After negative aspiration for heme or CSF, an epidurogram was obtained using 3 cc Omnipaque contrast confirming epidural placement of the needle.   Lumbar epidurogram showed no intrathecal or intravascular spread and showed adequate bilateral epidural spread from L1 to S1 levels.   After this, 5 cc of preservative free normal saline and 80 mg of kenalog were injected into the epidural space.   The needle was subsequently removed.  Anesthesia personnel were present throughout the procedure.   The patient tolerated the procedure well and was instructed to contact me immediately if there were any problems.     Nathan Hooper M.D.

## 2024-06-12 RX ORDER — APIXABAN 2.5 MG/1
2.5 TABLET, FILM COATED ORAL
Qty: 180 | Refills: 0 | Status: ACTIVE | COMMUNITY
Start: 2022-08-02 | End: 1900-01-01

## 2024-06-17 ENCOUNTER — APPOINTMENT (OUTPATIENT)
Dept: PAIN MANAGEMENT | Facility: CLINIC | Age: 49
End: 2024-06-17
Payer: COMMERCIAL

## 2024-06-17 VITALS — WEIGHT: 315 LBS | HEIGHT: 78 IN | BODY MASS INDEX: 36.45 KG/M2

## 2024-06-17 DIAGNOSIS — M54.17 RADICULOPATHY, LUMBOSACRAL REGION: ICD-10-CM

## 2024-06-17 DIAGNOSIS — M48.061 SPINAL STENOSIS, LUMBAR REGION WITHOUT NEUROGENIC CLAUDICATION: ICD-10-CM

## 2024-06-17 PROCEDURE — 99213 OFFICE O/P EST LOW 20 MIN: CPT

## 2024-06-17 RX ORDER — TIZANIDINE HYDROCHLORIDE 4 MG/1
4 CAPSULE ORAL
Qty: 60 | Refills: 0 | Status: ACTIVE | COMMUNITY
Start: 2024-06-17 | End: 1900-01-01

## 2024-06-18 ENCOUNTER — NON-APPOINTMENT (OUTPATIENT)
Age: 49
End: 2024-06-18

## 2024-06-18 ENCOUNTER — APPOINTMENT (OUTPATIENT)
Dept: CARDIOLOGY | Facility: CLINIC | Age: 49
End: 2024-06-18
Payer: COMMERCIAL

## 2024-06-18 VITALS
BODY MASS INDEX: 36.45 KG/M2 | HEART RATE: 97 BPM | SYSTOLIC BLOOD PRESSURE: 153 MMHG | HEIGHT: 78 IN | TEMPERATURE: 97 F | WEIGHT: 315 LBS | OXYGEN SATURATION: 89 % | DIASTOLIC BLOOD PRESSURE: 98 MMHG

## 2024-06-18 DIAGNOSIS — L98.9 DISORDER OF THE SKIN AND SUBCUTANEOUS TISSUE, UNSPECIFIED: ICD-10-CM

## 2024-06-18 PROBLEM — M48.061 LUMBAR FORAMINAL STENOSIS: Status: ACTIVE | Noted: 2022-07-05

## 2024-06-18 PROBLEM — M54.17 LUMBOSACRAL RADICULITIS: Status: ACTIVE | Noted: 2022-07-28

## 2024-06-18 PROCEDURE — 99214 OFFICE O/P EST MOD 30 MIN: CPT

## 2024-06-18 PROCEDURE — 93000 ELECTROCARDIOGRAM COMPLETE: CPT

## 2024-06-18 RX ORDER — TIZANIDINE 4 MG/1
4 TABLET ORAL
Qty: 60 | Refills: 0 | Status: ACTIVE | COMMUNITY
Start: 2024-06-18 | End: 1900-01-01

## 2024-06-18 NOTE — HISTORY OF PRESENT ILLNESS
[Lower back] : lower back [5] : 5 [4] : 4 [Dull/Aching] : dull/aching [Radiating] : radiating [Tightness] : tightness [Tingling] : tingling [Intermittent] : intermittent [Household chores] : household chores [Leisure] : leisure [Work] : work [Social interactions] : social interactions [Meds] : meds [Ice] : ice [Physical therapy] : physical therapy [Injection therapy] : injection therapy [Sitting] : sitting [Standing] : standing [Walking] : walking [Bending forward] : bending forward [Stairs] : stairs [Lying in bed] : lying in bed [FreeTextEntry1] : L4-5 LESI- 5/29/2024 L4-5 LESI- 3/14/2024 [] : no [FreeTextEntry7] : right side radiating down , hip and foot , calf  [de-identified] :

## 2024-06-18 NOTE — DISCUSSION/SUMMARY
[EKG obtained to assist in diagnosis and management of assessed problem(s)] : EKG obtained to assist in diagnosis and management of assessed problem(s) [FreeTextEntry1] : 46M hx b/l total hip replacements, hypertriglyceridemia, gout, lumbar radiculopathy, referred to vascular cardiology for DVT/PE and management of thromboembolic disease.   Acute DVT of Left Lower Extremity Recently had left calf swelling July 2022 with LE Dopplers of LLE showing above the knee DVT. Also found to have small PE on CTA chest at [place], started on Lovenox, transitioned to Eliquis on discharge 7/23/22. Ortho/pain mgmt planning on spinal steroid injections but need recommendations about anticoagulation. Repeat DVT scan 9/2022 with improved DVT. Repeat now shows chronic changes but same spot. D dimer negative at 179. -now that he is at 6 months, he can proceed with injection but would continue anticoagulation around procedure. No bridge required.  -no indication for IVC filter if anticoagulation needs to be held -low Villata score, no pain in the lower leg, baseline swelling  -agree with hematology, c/w Eliquis 2.5 mg BID low dose  -ace bandage of left leg - Hypercoagulable study as per hematology with Factor V Leiden and prothrombin gene mutation testing today  Lower Leg Pigmentation Not classic for post venous stasis or thrombosis,  -dermatology consult   RTO 6 months

## 2024-06-18 NOTE — HISTORY OF PRESENT ILLNESS
[Home] : at home, [unfilled] , at the time of the visit. [Medical Office: ([facility])___] : at the medical office located in  [Verbal consent obtained from patient] : the patient, [unfilled] [FreeTextEntry1] : Reshma Lynne is a 46M hx b/l total hip replacements, hypertriglyceridemia, gout, lumbar radiculopathy, who presents today for follow up post DVT/PE. Initially had PE postoperatively after bilateral hip replacement in 2010. Was started on warfarin, anticoagulated for 3 months, stopped it for 2 weeks, hypercoagulable workup was negative. No events between 2010 and now. He was having pain in the hips, thought it was due to an issue with the prosthetics, had some Xrays done of the hips. Also had some pain in his back so he had Xrays of the back as well in July. Plan with ortho was to have PT and cortisone injections. However, lower left leg was swollen at the time, so ortho ordered LE Dopplers. DVT found; sent to [place] [facility]. Admitted, found to have a small PE as well. Started on Lovenox, discharged 7/23/22 on Eliquis. He was supposed to get cortisone shots for his lumbar radiculopathy, but cannot due to the Eliquis.   Denies chest pain, sob however states when he ambulates, he starts to feel numbness in the leg and heaviiness.  Apparently, when back got worse, he felt pain there with shooting pain to leg which he still has however the numbness, fullness is new in the left lower leg. No ulcers.No leg pain.   Repeat scan of DVT in Sept 21: improved clot burden with now left popliteal and down thrombus. Femoral clear.  Jan 2023: still with left pop, gastroc and L PTV chronic DVT  Hematology Dr. Goldberg Seeing pain management given return of back pain.  Does complain of leg cramps, vein predominance.

## 2024-06-18 NOTE — PHYSICAL EXAM
[de-identified] : PHYSICAL EXAM  Constitutional:  Appears well, no apparent distress Ability to communicate: Normal Respiratory: non-labored breathing Skin: no rash noted Head: normocephalic, atraumatic Neck: no visible thyroid enlargement Eyes: extraocular movements intact Neurologic: alert and oriented x3 Psychiatric: normal mood, affect, and behavior   Back, including spine: Range of motion of the thoracic and lumbar spine is as follows: Diminished range of motion in all planes.  MMT 5/5 BL LE.  Sensation is intact to light touch and pin prick BL LE.  Negative Straight leg raise testing bilaterally.  Negatvie Kemps maneuver bilaterally.  Normal Gait.   Assessment: Lumbago  Plan: After discussing various treatment options with the patient including but not limited to oral medications, physical therapy, exercise modalities as well as interventional spinal injections, we have decided with the following plan:   Continue home exercises, stretching, activity modification, physical therapy, and conservative care.

## 2024-06-18 NOTE — CARDIOLOGY SUMMARY
[de-identified] : Sept 21 2022: Left sided popliteal and down thrombus (no femoral involvement) \par  \par  60Plm1980 05:44PM \par  DOPPLER VENOUS LEFT LOWER EXTREMITY \par  DOPPLER VENOUS LEFT LOWER EXTREMITY: EXAM: DOPPLER VENOUS LEFT LOWER EXTREMITY\par  \par  HISTORY: M79.662 Left lower leg pain\par  \par  \par  COMPARISON: None.\par  \par  FINDINGS:\par  Duplex and color-flow Doppler imaging of the lower extremity venous system was\par  performed. There is thrombus noted in the distal femoral vein, popliteal vein,\par  proximal posterior tibial vein and left gastrocnemius vein. The remainder of the\par  left lower extremity deep venous system is patent.\par  \par  There is no Baker's cyst.\par  \par  IMPRESSION:\par  Positive deep venous thrombosis.\par  \par  Signed by: Cassy Edwards MD\par   Signed Date: 7/21/2022 5:51 PM EDT

## 2024-06-18 NOTE — ASSESSMENT
[FreeTextEntry1] : >70% relief pp with improved ROM and ADLS  Pt. presents with 70% Relief of pain and improvement in ROM and ADLS post injection.  Able to sit, stand, walk, sleep for longer periods of time.

## 2024-06-27 ENCOUNTER — RESULT REVIEW (OUTPATIENT)
Age: 49
End: 2024-06-27

## 2024-06-27 ENCOUNTER — APPOINTMENT (OUTPATIENT)
Dept: HEMATOLOGY ONCOLOGY | Facility: CLINIC | Age: 49
End: 2024-06-27
Payer: COMMERCIAL

## 2024-06-27 VITALS
OXYGEN SATURATION: 96 % | BODY MASS INDEX: 40.88 KG/M2 | WEIGHT: 315 LBS | RESPIRATION RATE: 18 BRPM | TEMPERATURE: 97.2 F | HEART RATE: 80 BPM | SYSTOLIC BLOOD PRESSURE: 145 MMHG | DIASTOLIC BLOOD PRESSURE: 89 MMHG

## 2024-06-27 DIAGNOSIS — I82.412 ACUTE EMBOLISM AND THROMBOSIS OF LEFT FEMORAL VEIN: ICD-10-CM

## 2024-06-27 LAB
BASOPHILS # BLD AUTO: 0.02 K/UL — SIGNIFICANT CHANGE UP (ref 0–0.2)
BASOPHILS NFR BLD AUTO: 0.5 % — SIGNIFICANT CHANGE UP (ref 0–2)
EOSINOPHIL # BLD AUTO: 0.11 K/UL — SIGNIFICANT CHANGE UP (ref 0–0.5)
EOSINOPHIL NFR BLD AUTO: 2.8 % — SIGNIFICANT CHANGE UP (ref 0–6)
HCT VFR BLD CALC: 40.7 % — SIGNIFICANT CHANGE UP (ref 39–50)
HGB BLD-MCNC: 14.2 G/DL — SIGNIFICANT CHANGE UP (ref 13–17)
IMM GRANULOCYTES NFR BLD AUTO: 0.5 % — SIGNIFICANT CHANGE UP (ref 0–0.9)
LYMPHOCYTES # BLD AUTO: 1.34 K/UL — SIGNIFICANT CHANGE UP (ref 1–3.3)
LYMPHOCYTES # BLD AUTO: 33.8 % — SIGNIFICANT CHANGE UP (ref 13–44)
MCHC RBC-ENTMCNC: 33.2 PG — SIGNIFICANT CHANGE UP (ref 27–34)
MCHC RBC-ENTMCNC: 34.9 G/DL — SIGNIFICANT CHANGE UP (ref 32–36)
MCV RBC AUTO: 95.1 FL — SIGNIFICANT CHANGE UP (ref 80–100)
MONOCYTES # BLD AUTO: 0.4 K/UL — SIGNIFICANT CHANGE UP (ref 0–0.9)
MONOCYTES NFR BLD AUTO: 10.1 % — SIGNIFICANT CHANGE UP (ref 2–14)
NEUTROPHILS # BLD AUTO: 2.07 K/UL — SIGNIFICANT CHANGE UP (ref 1.8–7.4)
NEUTROPHILS NFR BLD AUTO: 52.3 % — SIGNIFICANT CHANGE UP (ref 43–77)
NRBC # BLD: 0 /100 WBCS — SIGNIFICANT CHANGE UP (ref 0–0)
NRBC BLD-RTO: 0 /100 WBCS — SIGNIFICANT CHANGE UP (ref 0–0)
PLATELET # BLD AUTO: 229 K/UL — SIGNIFICANT CHANGE UP (ref 150–400)
RBC # BLD: 4.28 M/UL — SIGNIFICANT CHANGE UP (ref 4.2–5.8)
RBC # FLD: 12.9 % — SIGNIFICANT CHANGE UP (ref 10.3–14.5)
WBC # BLD: 3.96 K/UL — SIGNIFICANT CHANGE UP (ref 3.8–10.5)
WBC # FLD AUTO: 3.96 K/UL — SIGNIFICANT CHANGE UP (ref 3.8–10.5)

## 2024-06-27 PROCEDURE — 99213 OFFICE O/P EST LOW 20 MIN: CPT

## 2024-06-28 PROBLEM — I82.412 ACUTE DEEP VEIN THROMBOSIS (DVT) OF FEMORAL VEIN OF LEFT LOWER EXTREMITY: Status: ACTIVE | Noted: 2022-08-02

## 2024-06-28 LAB
ALBUMIN SERPL ELPH-MCNC: 4.6 G/DL
ALP BLD-CCNC: 84 U/L
ALT SERPL-CCNC: 21 U/L
ANION GAP SERPL CALC-SCNC: 13 MMOL/L
AST SERPL-CCNC: 18 U/L
BILIRUB SERPL-MCNC: 0.2 MG/DL
BUN SERPL-MCNC: 13 MG/DL
CALCIUM SERPL-MCNC: 9.9 MG/DL
CHLORIDE SERPL-SCNC: 105 MMOL/L
CO2 SERPL-SCNC: 26 MMOL/L
CREAT SERPL-MCNC: 1.01 MG/DL
EGFR: 93 ML/MIN/1.73M2
GLUCOSE SERPL-MCNC: 97 MG/DL
POTASSIUM SERPL-SCNC: 4.5 MMOL/L
PROT SERPL-MCNC: 7.7 G/DL
SODIUM SERPL-SCNC: 144 MMOL/L

## 2024-10-14 ENCOUNTER — RX RENEWAL (OUTPATIENT)
Age: 49
End: 2024-10-14

## 2024-11-19 ENCOUNTER — APPOINTMENT (OUTPATIENT)
Dept: CARDIOLOGY | Facility: CLINIC | Age: 49
End: 2024-11-19

## 2024-12-17 ENCOUNTER — APPOINTMENT (OUTPATIENT)
Dept: CARDIOLOGY | Facility: CLINIC | Age: 49
End: 2024-12-17

## 2025-01-30 ENCOUNTER — RX RENEWAL (OUTPATIENT)
Age: 50
End: 2025-01-30